# Patient Record
Sex: MALE | Race: WHITE
[De-identification: names, ages, dates, MRNs, and addresses within clinical notes are randomized per-mention and may not be internally consistent; named-entity substitution may affect disease eponyms.]

---

## 2018-08-07 ENCOUNTER — HOSPITAL ENCOUNTER (EMERGENCY)
Dept: HOSPITAL 25 - UCCORT | Age: 83
Discharge: HOME | End: 2018-08-07
Payer: MEDICARE

## 2018-08-07 VITALS — DIASTOLIC BLOOD PRESSURE: 53 MMHG | SYSTOLIC BLOOD PRESSURE: 120 MMHG
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
## 2018-08-07 DIAGNOSIS — I10: ICD-10-CM

## 2018-08-07 DIAGNOSIS — S30.0XXA: Primary | ICD-10-CM
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
## 2018-08-07 DIAGNOSIS — Z88.0: ICD-10-CM

## 2018-08-07 DIAGNOSIS — J44.9: ICD-10-CM

## 2018-08-07 PROCEDURE — 81003 URINALYSIS AUTO W/O SCOPE: CPT

## 2018-08-07 PROCEDURE — 87077 CULTURE AEROBIC IDENTIFY: CPT

## 2018-08-07 PROCEDURE — 99212 OFFICE O/P EST SF 10 MIN: CPT

## 2018-08-07 PROCEDURE — 87186 SC STD MICRODIL/AGAR DIL: CPT

## 2018-08-07 PROCEDURE — 72110 X-RAY EXAM L-2 SPINE 4/>VWS: CPT

## 2018-08-07 PROCEDURE — G0463 HOSPITAL OUTPT CLINIC VISIT: HCPCS

## 2018-08-07 PROCEDURE — 87086 URINE CULTURE/COLONY COUNT: CPT

## 2018-08-07 NOTE — UC
Back Pain HPI





- HPI Summary


HPI Summary: 





The patient is an 83-year-old male who about 12:30 PM today got up on a table 

to adjust a picture.  He fell off the table hitting his back on a bench.  He 

was initially unable to stand due to the pain.  He crawled across the floor and 

after about a half hour was able to stand up.  He states that the pain began to 

fade and he felt pretty comfortable and went home.  He took a 2 hour nap and 

when he woke up the pain was very bad and made it difficult for him to stand.  

He has had no bowel or bladder dysfunction.  He states his back hurts about the 

level of his belt line.  He has had no difficulty urinating and notes that 

there was no blood in his urine.  He has no numbness or tingling of his legs.  

Denies any neck pain head injury chest pain or abdominal pain.  His hips do not 

hurt.  He has not taken anything for pain.





- History of Current Complaint


Chief Complaint: UCBackPain


Stated Complaint: BACK PAIN/PT FELL TODAY


Time Seen by Provider: 08/07/18 17:50


Hx Obtained From: Patient


Onset/Duration: Sudden Onset, Lasting Hours


Severity Initially: Severe


Severity Currently: Severe


Pain Intensity: 10


Back Pain: Is Discrete @


Character: Aching, Spasmodic, Stiffness


Aggravating Factor(s): Movement


Alleviating Factor(s): Rest


Associated Signs And Symptoms: Positive: Negative


Full Body (No Head): 


  __________________________














  __________________________





 1 - tender








- Allergies/Home Medications


Allergies/Adverse Reactions: 


 Allergies











Allergy/AdvReac Type Severity Reaction Status Date / Time


 


Penicillins Allergy  Anaphylatic Verified 08/07/18 17:43





   Shock  











Home Medications: 


 Home Medications





Albuterol Sulfate [Proventil Hfa] 2 puff IH QID 08/07/18 [History Confirmed 08/ 07/18]


DOXYcycline CAP(*) [DOXYcycline 100MG CAP(*)] 100 mg PO DAILY 08/07/18 [History 

Confirmed 08/07/18]


Levocetirizine Dihydrochloride [Xyzal] 5 mg PO DAILY 08/07/18 [History 

Confirmed 08/07/18]


Multivitamin [One-Tablet-Daily] 1 each PO DAILY 08/07/18 [History Confirmed 08/ 07/18]


Spiriva Inhaler DEVICE* [Tiotropium Inhaler DEVICE*] 18 mcg DAILY 08/07/18 [

History Confirmed 08/07/18]











PMH/Surg Hx/FS Hx/Imm Hx


Previously Healthy: Yes


Cardiovascular History: Hypertension


Respiratory History: COPD





- Surgical History


Surgical History: Yes


Surgery Procedure, Year, and Place: Cataracts 10/15





- Family History


Known Family History: Positive: Hypertension





- Social History


Alcohol Use: None


Substance Use Type: None


Smoking Status (MU): Never Smoked Tobacco





Review of Systems


Constitutional: Negative


Skin: Negative


Eyes: Negative


ENT: Negative


Respiratory: Negative


Cardiovascular: Negative


Gastrointestinal: Negative


Genitourinary: Negative


Motor: Negative


Neurovascular: Negative


Musculoskeletal: Arthralgia, Myalgia


Neurological: Negative


Psychological: Negative


Is Patient Immunocompromised?: No


All Other Systems Reviewed And Are Negative: Yes





Physical Exam


Triage Information Reviewed: Yes


Appearance: Well-Appearing, Well-Nourished, Pain Distress


Vital Signs: 


 Initial Vital Signs











Temp  98.3 F   08/07/18 17:39


 


Pulse  103   08/07/18 17:39


 


Resp  19   08/07/18 17:39


 


BP  165/71   08/07/18 17:39


 


Pulse Ox  95   08/07/18 17:39











Vital Signs Reviewed: Yes


Eyes: Positive: Conjunctiva Clear


ENT: Positive: Hearing grossly normal, Uvula midline.  Negative: Nasal 

congestion, Nasal drainage, Trismus, Muffled voice, Hoarse voice


Neck: Positive: Supple, Nontender, No Lymphadenopathy


Respiratory: Positive: Chest non-tender, Lungs clear, Normal breath sounds, No 

respiratory distress, No accessory muscle use


Cardiovascular: Positive: RRR, No Murmur


Abdomen Description: Positive: Nontender, No Organomegaly, Soft


Musculoskeletal: Positive: ROM Intact, No Edema


Neurological Exam: Normal


Neurological: Positive: Alert





Diagnostics





- Radiology


  ** No standard instances


Xray Interpretation: No Acute Changes - Negative for lumbar sacral spine 

fracture or malalignment


Radiology Interpretation Completed By: Radiologist





Re-Evaluation





- Re-Evaluation


  ** First Eval


Re-Evaluation Time: 19:20


Change: Improved


Comment: pain markedly decreased





Back Pain Course/Dx





- Differential Dx/Diagnosis


Provider Diagnoses: back contusion





Discharge





- Sign-Out/Discharge


Documenting (check all that apply): Patient Departure





- Discharge Plan


Condition: Stable


Disposition: HOME


Patient Education Materials:  Contusion in Adults (ED)


Referrals: 


Jasiel Granda DO [Primary Care Provider] - 3 Days (recheck in 3-7 days)


Additional Instructions: 


aleve 1-2 twice daily for pain





tylenol





ice





- Billing Disposition and Condition


Condition: STABLE


Disposition: Home

## 2018-08-07 NOTE — RAD
Indication: Mid lower back pain post fall.



Comparison: No relevant prior exams available on the [facility] PACS for comparison.



Technique: AP, lateral, and oblique views lumbar sacral spine.



Report: Alignment is anatomic. No cortical disruption or trabecular impaction to indicate

a vertebral body fracture. Oblique views without evidence for spondylolysis. Minimal

multilevel vertebral and plate osteophytosis without significant disc space narrowing.

Multilevel predominant mild facet joint osteoarthritis. Unremarkable soft tissue contours.





IMPRESSION: 

#. Negative for lumbar sacral spine fracture or malalignment.

## 2018-08-09 NOTE — UC
- Progress Note


Progress Note: 





UTI + enteroccus


Pt on Doxy


await sensitivity


no change


Ljj 8/9/18





Re-Evaluation





- Re-Evaluation


  ** First Eval


Re-Evaluation Time: 19:20


Change: Improved


Comment: pain markedly decreased





Discharge





- Sign-Out/Discharge


Documenting (check all that apply): Post-Discharge Follow Up





- Discharge Plan


Condition: Stable


Disposition: HOME


Patient Education Materials:  Contusion in Adults (ED)


Referrals: 


Jasiel Granda DO [Primary Care Provider] - 3 Days (recheck in 3-7 days)


Additional Instructions: 


aleve 1-2 twice daily for pain





tylenol





ice





- Billing Disposition and Condition


Condition: STABLE


Disposition: Home

## 2018-10-05 ENCOUNTER — HOSPITAL ENCOUNTER (EMERGENCY)
Dept: HOSPITAL 25 - UCCORT | Age: 83
Discharge: HOME | End: 2018-10-05
Payer: MEDICARE

## 2018-10-05 VITALS — SYSTOLIC BLOOD PRESSURE: 151 MMHG | DIASTOLIC BLOOD PRESSURE: 46 MMHG

## 2018-10-05 DIAGNOSIS — Z87.891: ICD-10-CM
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
## 2018-10-05 DIAGNOSIS — Z88.0: ICD-10-CM

## 2018-10-05 DIAGNOSIS — S01.312A: ICD-10-CM

## 2018-10-05 PROCEDURE — 99211 OFF/OP EST MAY X REQ PHY/QHP: CPT

## 2018-10-05 PROCEDURE — 12011 RPR F/E/E/N/L/M 2.5 CM/<: CPT

## 2018-10-05 PROCEDURE — G0463 HOSPITAL OUTPT CLINIC VISIT: HCPCS

## 2018-10-05 NOTE — UC
Skin Complaint HPI





- HPI Summary


HPI Summary: 





Patient states that he lost his footing at [facility] and tripped causing him to 

hit his left ear on a table.  This happened about one and half hours prior to 

arrival he denies loss of consciousness, headache, visual change, neck and back 

pain.  He denies any other injuries offers no other complaints.  He is not on 

blood thinners other than daily aspirin.  His tetanus is within the past 10 

years.





- History of Current Complaint


Chief Complaint: UCLaceration


Time Seen by Provider: 10/05/18 13:17


Stated Complaint: LEFT EAR LAC


Hx Obtained From: Patient


Onset/Duration: Sudden Onset


Timing: Constant


Pain Intensity: 0


Location: Ear (Left)


Aggravating Factor(s): Nothing


Alleviating Factor(s): Nothing - Pinna


Associated Signs & Symptoms: Negative: Nausea, Syncope, Joint Swelling





- Allergy/Home Medications


Allergies/Adverse Reactions: 


 Allergies











Allergy/AdvReac Type Severity Reaction Status Date / Time


 


Penicillins Allergy  Anaphylatic Verified 10/05/18 13:00





   Shock  














Review of Systems


Constitutional: Negative


Skin: Other - Cut to left ear


Eyes: Negative


ENT: Negative


Respiratory: Negative


Cardiovascular: Negative


Gastrointestinal: Negative


Genitourinary: Negative


Motor: Negative


Neurovascular: Negative


Musculoskeletal: Negative


Neurological: Negative


Psychological: Negative


Is Patient Immunocompromised?: No


All Other Systems Reviewed And Are Negative: Yes





PMH/Surg Hx/FS Hx/Imm Hx





- Additional Past Medical History


Additional PMH: 





Hypotension, COPD, allergies.





- Surgical History


Surgical History: Yes


Surgery Procedure, Year, and Place: Cataracts 10/15





- Family History


Known Family History: Positive: Hypertension





- Social History


Lives: With Family


Alcohol Use: None


Substance Use Type: None


Smoking Status (MU): Former Smoker


When Did the Patient Quit Smoking/Using Tobacco: 96





- Immunization History


Most Recent Tetanus Shot: 3-4 years ago


Hx Tetanus, Diphtheria Vaccination: Yes


Vaccination Up to Date: Yes





Physical Exam


Triage Information Reviewed: Yes


Appearance: Well-Appearing


Vital Signs: 


 Initial Vital Signs











Temp  98.2 F   10/05/18 13:03


 


Pulse  88   10/05/18 13:03


 


Resp  18   10/05/18 13:03


 


BP  151/46   10/05/18 13:03


 


Pulse Ox  96   10/05/18 13:03











Vital Signs Reviewed: Yes


Eyes: Positive: Conjunctiva Clear, Other: - Pupils status post cataract.


ENT: Positive: Pharynx normal, TMs normal, Other - There is a 2 cm laceration 

to the pinna of the left ear.  I cannot appreciate a Cartledge defect.  There 

is mild bruising but no auricular hematoma..  Negative: Nasal congestion, Nasal 

drainage


Neck: Positive: Supple, Nontender, No Lymphadenopathy


Respiratory: Positive: Lungs clear, Normal breath sounds


Cardiovascular: Positive: RRR, No Murmur


Abdomen Description: Positive: Nontender, No Organomegaly, Soft


Bowel Sounds: Positive: Present


Musculoskeletal: Positive: Other: - No cranial or facial bone instability or 

tenderness.  Cervical thoracic and lumbar spine are without deformity or 

tenderness.  Extremities are atraumatic.


Neurological: Positive: Other: - Patient is alert and oriented to person place 

and time.  Cranial nerves grossly intact.  Sensorivascular motor function 

intact 4.  Normal steady gait.


Psychological: Positive: Age Appropriate Behavior


Skin Exam: Normal





Course/Dx





- Course


Course Of Treatment: Procedure: time out done. prep betadine. local with 1ml 1% 

lidocaine. explord, no FB and no cartilage damage appreciated.  Site irrigated 

with copious amounts sterile sodium chloride.  Reprepped with Betadine.  Draped 

in sterile fashion.  Closed with 6-0 nylon and 5 interrupted stitches.  Good 

approximation of wound edges no bleeding postprocedure he should tolerate 

procedure well. 2.0cm in length.  s/s's auricular hematoma d/w pt, he will seek 

recheck immediately for any worsening.





- Diagnoses


Provider Diagnoses: 2cm lac L ear





Discharge





- Sign-Out/Discharge


Documenting (check all that apply): Patient Departure


All imaging exams completed and their final reports reviewed: No Studies





- Discharge Plan


Condition: Stable


Disposition: HOME


Patient Education Materials:  Care For Your Stitches (DC)


Referrals: 


Jasiel Granda DO [Primary Care Provider] - 


Additional Instructions: 


SUTURES OUT IN 5 DAYS.


RECHECK IMMEDIATELY FOR SWELLING.





- Billing Disposition and Condition


Condition: STABLE


Disposition: Home

## 2018-10-05 NOTE — XMS REPORT
Abdoul Feliz

 Created on:2018



Patient:Abdoul Feliz

Sex:Male

:1935

External Reference #:[id number]





Demographics







 Address  [address] [place] [address]

 

 Home Phone  [phone]

 

 Preferred Language  English

 

 Marital Status  Not  Or 

 

 [employer] Affiliation  Unknown

 

 Race  White

 

 Ethnic Group  Not  Or 









Author







 Organization  [facility]

 

 Address  [address], [address] [place] [address]

 

 Phone  [phone]









Support







 Name  Relationship  Address  Phone

 

 Marina Feliz  Wife  Unavailable  [phone]









Care Team Providers







 Name  Role  Phone

 

 KaleeJasiel   Primary Care Physician  Unavailable









Payers







 Type  Date  Identification Numbers  Payment Provider  Subscriber

 

 Medicare Primary  Effective:  Policy Number:  Medicare  Abdoul Felzi



   2000  [id number]    









 PayID: [id number]  [address]









 [place] [address]









 Medigap Part B  Effective: 2017  Policy Number:  [facility]  Abdoul Feliz



     [id number]    









 PayID: [id number]  [address] [address]









 [place] [address]







Problems







 Description

 

 No Information







Family History







 Date  Family Member(s)  Problem(s)  Comments

 

 :  (age 63  Father   due to Natural  



 Years)    Causes  

 

 :  (age 72  Mother   due to Breast  Secondary to compliactions



 Years)    Cancer  of breast cancer with



       metastasis







Social History







 Type  Date  Description  Comments

 

 Marital Status      

 

 Home Environment    Lives With  Wife

 

 Cigarette Use    Former Cigarette Smoker  Quit in 

 

 ETOH Use    Occasionally consumes alcohol  

 

 Recreational Drug Use    Denies Drug Use  

 

 Smoking    Patient is a former smoker  







Allergies, Adverse Reactions, Alerts







 Date  Description  Reaction  Status  Severity  Comments

 

 2017  Penicillins    active    







Medications







 Medication  Date  Status  Form  Strength  Qnty  SIG  Indications  Ordering



                 Provider

 

 Stiolto Respimat    Active  Aerosol  2.5-2.5mc  4unit  take 2  J44.9  
[place]heti,



         g/Act  s  puffs once    MD Alfonso



             daily.    

 

 Spiriva    Active  Capsules  18mcg  30cap  Inhale The  J44.9  Kheti,



 Handihaler          s  Contents    MD Alfonso



             Of One    



             Capsule    



             Via    



             Handihaler    



             By Mouth    



             Daily    

 

 Advair Diskus    Active  Aerosol  500-50mcg    inhale one    Unknown



   /0000      /Dose    puff by    



             mouth once    



             a day if    



             needed.    

 

 Montelukast    Active  Tablets  10mg    1 by mouth    Unknown



 Sodium  /0000          every day    

 

 Hydrochlorothiazi    Active  Tablets  25mg    1 by mouth    Unknown



 de  /          every day    

 

 Atorvastatin    Active  Tablets  10mg    1 by mouth    Unknown



 Calcium  /0000          every day    

 

 Ventolin HFA    Active  Aerosol  108(90Bas    1-2 puffs    Unknown



   /0000      e)    every 4-6    



         mcg/Act    hours as    



             needed    

 

 Dutasteride    Active  Capsules  0.5mg    1 capsule    Unknown



   /          daily.    

 

 Levocetirizine    Active  Tablets  5mg    1 by mouth    Unknown



 Dihydrochloride  /          every day    

 

 Olopatadine HCL    Active  Solution  0.1%    1-2 drops    Unknown



   /          each eye    



             once to    



             twice    



             daily as    



             beeded,    

 

 Vitamin C    Active  Tablets  500mg    1 tablet    Unknown



   /          daily    

 

 Calcium 600 + D    Active  Tablets  600-200mg    twice a    Unknown



   /0000      -Unit    day    

 

 Multivitamin    Active  Tablets  Adlt 50+    1 by mouth    Unknown



 Adults 50+  /0000          every day    

 

 Aspir-81    Active  Tablets DR  81mg    1 by mouth    Unknown



   /          every day    

 

 Lisinopril-Hydroc    Active  Tablets  20-25mg    1 by mouth    Unknown



 hlorothiazide  /0000          every day    

 

 Nasonex    Active  Suspension  50mcg/Act    one spray    Unknown



   /          each    



             nostril    



             one to two    



             times a    



             day    

 

                 

 

 Spiriva    Hx  Capsules  18mcg    1    Unknown



 Handihaler  /0000          inhalation    



             every day    

 

 Lisinopril    Hx  Tablets  10mg    1 by mouth    Unknown



   /0000          every day    

 

 Ipratropium    Hx  Solution  0.06%    2 sprays    Unknown



 Bromide  /0000          to each    



             nostril    



             twice a    



             day    

 

 Doxycycline    Hx  Tablets  20mg    1 tab in    Unknown



 Hyclate  /0000          am; 1 tab    



             in pm. 3    



             months on    



             - 3 months    



             off.    

 

 Spiriva Respimat    Hx  Aerosol  2.5mcg/Ac    take 2    Unknown



   /0000      t    puffs once    



             daily.    







Vital Signs







 Date  Vital  Result  Comment

 

 2018  BP Systolic Sitting Left Arm  128 mmHg  









 BP Diastolic Sitting Left Arm  58 mmHg  

 

 Heart Rate  103 /min  

 

 Respiratory Rate  16 /min  

 

 Height  70 inches  5'10"

 

 Weight  187.00 lb  

 

 BMI (Body Mass Index)  26.8 kg/m2  

 

 BSA (Body Surface Area)  2.03 m2  

 

 Ideal body weight in kilograms  75  

 

 O2 Saturation Level with Exercise  94 %  









 2017  BP Systolic Sitting Right Arm  128 mmHg  









 BP Diastolic Sitting Right Arm  70 mmHg  

 

 Heart Rate  78 /min  

 

 Respiratory Rate  18 /min  

 

 Height  70 inches  5'10"

 

 Weight  180.00 lb  

 

 BMI (Body Mass Index)  25.8 kg/m2  

 

 BSA (Body Surface Area)  2.00 m2  

 

 Ideal body weight in kilograms  75  

 

 O2 % BldC Oximetry  95 %  









 2017  BP Systolic Sitting Right Arm  132 mmHg  









 BP Diastolic Sitting Right Arm  52 mmHg  

 

 Heart Rate  72 /min  

 

 Respiratory Rate  18 /min  

 

 Height  70 inches  5'10"

 

 Weight  187.00 lb  

 

 BMI (Body Mass Index)  26.8 kg/m2  

 

 BSA (Body Surface Area)  2.03 m2  

 

 Ideal body weight in kilograms  75  

 

 O2 % BldC Oximetry  100 %  ra







Results







 Test  Date  Test  Result  H/L  Range  Note

 

 Lymphocytes/leuk NFr  2017  Lymphocytes/leuk NFr  5.9  Low  20.0-42.0  



 Bld Auto    Bld Auto        

 

 MCH RBC Qn Auto  2017  MCH RBC Qn Auto  31.0    27.0-33.0  

 

 MCHC RBC Auto-mCnc  2017  MCHC RBC Auto-mCnc  32.2    31.7-36.0  

 

 MCV RBC Auto  2017  MCV RBC Auto  96.2  High  80.0-96.0  

 

 Monocytes # Bld Auto  2017  Monocytes # Bld Auto  0.33    0.0-0.8  

 

 Monocytes/leuk NFr  2017  Monocytes/leuk NFr  3.0    0.0-10.0  



 Bld Auto    Bld Auto        

 

 Neutrophils # Bld  2017  Neutrophils # Bld  9.94  High  1.8-7.0  



 Auto    Auto        

 

 Neutrophils/leuk NFr  2017  Neutrophils/leuk NFr  91.1  High  33.0-73.0  



 Bld Auto    Bld Auto        

 

 PMV Bld Auto  2017  PMV Bld Auto  11.0  High  6.6-10.6  

 

 Platelets [#/volume]  2017  Platelets [#/volume]  202    150-400  



 in Blood by Automated    in Blood by Automated        



 count    count        

 

 Potassium SerPl-sCnc  2017  Potassium SerPl-sCnc  4.6    3.5-5.1  

 

 RBC # Bld Auto  2017  RBC # Bld Auto  3.97  Low  4.20-5.80  

 

 RDW RBC Auto  2017  RDW RBC Auto  46.9    36-51  

 

 RDW RBC Auto-Rto  2017  RDW RBC Auto-Rto  13.7    11.6-15.8  

 

 Sodium SerPl-sCnc  2017  Sodium SerPl-sCnc  142    136-145  

 

 Unloinc  2017  Unloinc  See Note      1

 

 WBC # Bld Auto  2017  WBC # Bld Auto  10.9  High  3.4-10.5  

 

 Legionella Culture  2017  Legionella Culture  (SEE NOTE)      2, 3

 

 Basic Metabolic Panel  2017  Glucose  169 mg/dL  High    2









 BUN  39 mg/dL  High  7-18  2

 

 Creatinine  1.2 mg/dL    0.6-1.3  2

 

 Glom Filtration Rate, Estimate  >60 mL/min    >60  2

 

 If   >60 mL/min    >60  2, 4

 

 BUN/Creat  32.5 ratio      2

 

 Sodium  142 mmol/L    136-145  2

 

 Potassium  4.6 mmol/L    3.5-5.1  2

 

 Chloride  104 mmol/L      2

 

 Carbon Dioxide  30 mmol/L    21-32  2

 

 Anion Gap  8 mEq/L    8-16  2

 

 Calcium  9.2 mg/dL    8.5-10.1  2









 CBS W/Automated Diff  2017  White Blood Count  10.9 K/uL  High  3.4-10.5
  2









 Red Blood Count  3.97 M/uL  Low  4.20-5.80  2

 

 Hemoglobin  12.3 gm/dL  Low  12.8-17.0  2

 

 Hematocrit  38.2 %    38.0-48.0  2

 

 Mean Cell Volume  96.2 fl  High  80.0-96.0  2

 

 Mean Corpuscular HGB  31.0 pg    27.0-33.0  2

 

 Mean Corpuscular HGB Conc  32.2 g/dL    31.7-36.0  2

 

 Platelet Count  202 K/uL    150-400  2

 

 Red Cell Distri Width SD  46.9 fl    36-51  2

 

 Red Cell Distri Width %CV  13.7 %    11.6-15.8  2

 

 Mean Platelet Volume  11.0 fL  High  6.6-10.6  2

 

 Neut%  91.1 %  High  33.0-73.0  2

 

 Lymph %  5.9 %  Low  20.0-42.0  2

 

 Mono %  3.0 %    0.0-10.0  2

 

 Eo%  0.0 %    0.0-6.6  2

 

 Bas%  0.0 %    0.0-1.1  2

 

 Neut#  9.94 K/uL  High  1.8-7.0  2

 

 Lymph #  0.64 K/uL  Low  1.0-4.0  2

 

 Mono #  0.33 K/uL    0.0-0.8  2

 

 Eos #  0.00 K/uL    0.0-0.5  2

 

 Baso #  0.00 K/uL    0.0-0.1  2









 Slide Review  2017  Slide Review  (SEE NOTE)      2, 5

 

 Anion Gap SerPl-sCnc  2017  Anion Gap SerPl-sCnc  8    8-16  

 

 BUN SerPl-mCnc  2017  BUN SerPl-mCnc  39  High  7-18  

 

 BUN/Creat SerPl  2017  BUN/Creat SerPl  32.5      

 

 Basophils [#/volume]  2017  Basophils [#/volume]  0.00    0.0-0.1  



 in Blood by Automated    in Blood by Automated        



 count    count        

 

 Basophils/leuk NFr  2017  Basophils/leuk NFr  0.0    0.0-1.1  



 Bld Auto    Bld Auto        

 

 Lymphocytes  2017  Lymphocytes  0.64  Low  1.0-4.0  



 [#/volume] in Blood    [#/volume] in Blood        



 by Automated count    by Automated count        

 

 Hgb Bld-mCnc  2017  Hgb Bld-mCnc  12.3  Low  12.8-17.0  

 

 Hct VFr Bld Auto  2017  Hct VFr Bld Auto  38.2    38.0-48.0  

 

 Glucose [Mass/volume]  2017  Glucose [Mass/volume]  169  High    



 in Serum or Plasma    in Serum or Plasma        

 

 Eosinophil/leuk NFr  2017  Eosinophil/leuk NFr  0.0    0.0-6.6  



 Bld Auto    Bld Auto        

 

 Co2 SerPl-sCnc  2017  Co2 SerPl-sCnc  30    21-32  

 

 Calcium SerPl-mCnc  2017  Calcium SerPl-mCnc  9.2    8.5-10.1  

 

 Eosinophil # Bld Auto  2017  Eosinophil # Bld Auto  0.00    0.0-0.5  

 

 Chloride SerPl-sCnc  2017  Chloride SerPl-sCnc  104      

 

 Creat SerPl-mCnc  2017  Creat SerPl-mCnc  1.2    0.6-1.3  

 

 Total Cells Counted  2017  Total Cells Counted  100      



 Bld    Bld        

 

 Neuts Seg/leuk NFr  2017  Neuts Seg/leuk NFr  70    33-73  



 Bld Manual    Bld Manual        

 

 Neuts Band/leuk NFr  2017  Neuts Band/leuk NFr  20  High  0-8  



 Bld Manual    Bld Manual        

 

 Monocytes/100  2017  Monocytes/100  1    0-10  



 leukocytes in Blood    leukocytes in Blood        



 by Manual count    by Manual count        

 

 Basic Metabolic Panel  2017  Glucose  173 mg/dL  High    2









 BUN  47 mg/dL  High  7-18  2

 

 Creatinine  1.3 mg/dL    0.6-1.3  2

 

 Glom Filtration Rate, Estimate  56 mL/min    >60  2

 

 If   >60 mL/min    >60  2, 6

 

 BUN/Creat  36.1 ratio      2

 

 Sodium  140 mmol/L    136-145  2

 

 Potassium  3.8 mmol/L    3.5-5.1  2

 

 Chloride  102 mmol/L      2

 

 Carbon Dioxide  30 mmol/L    21-32  2

 

 Anion Gap  8 mEq/L    8-16  2

 

 Calcium  9.1 mg/dL    8.5-10.1  2









 CBS W/Automated Diff  2017  White Blood Count  11.2 K/uL  High  3.4-10.5
  2









 Red Blood Count  3.96 M/uL  Low  4.20-5.80  2

 

 Hemoglobin  12.5 gm/dL  Low  12.8-17.0  2

 

 Hematocrit  38.2 %    38.0-48.0  2

 

 Mean Cell Volume  96.5 fl  High  80.0-96.0  2

 

 Mean Corpuscular HGB  31.6 pg    27.0-33.0  2

 

 Mean Corpuscular HGB Conc  32.7 g/dL    31.7-36.0  2

 

 Platelet Count  187 K/uL    150-400  2

 

 Red Cell Distri Width SD  47.6 fl    36-51  2

 

 Red Cell Distri Width %CV  13.9 %    11.6-15.8  2

 

 Mean Platelet Volume  11.7 fL  High  6.6-10.6  2, 7

 

 Neut#  10.13 K/uL  High  1.8-7.0  2

 

 Lymph #  0.58 K/uL  Low  1.0-4.0  2

 

 Mono #  0.46 K/uL    0.0-0.8  2

 

 Eos #  0.00 K/uL    0.0-0.5  2

 

 Baso #  0.01 K/uL    0.0-0.1  2









 Slide Review  2017  Slide Review  DIFF ORDERED      2

 

 Lymphocytes/100  2017  Lymphocytes/100  9  Low  20-42  



 leukocytes in Blood by    leukocytes in Blood by        



 Manual coun    Manual count        

 

 Differential-WBC Confirm  2017  Total Cells Counted  100 #CELLS      2









 Band%  20 %  High  0-8  2

 

 Neutrophils%  70 %    33-73  2

 

 Lymph%  9 %  Low  20-42  2

 

 Monocyte%  1 %    0-10  2

 

 Platelet Estimate  NORMAL      2

 

 Dohle Bodies  0-1+      2









 Blood platelet adequacy  2017  Blood platelet adequacy  Normal      



 detection by light    detection by light        



 microsc    microscopy        

 

 Serum or plasma  2017  Serum or plasma  0.094      



 troponin i.cardiac    troponin i.cardiac        



 measurement (ma    measurement        



     (mass/volume)        

 

 Serum or plasma  2017  Serum or plasma  158      



 creatine kinase    creatine kinase        



 measurement (enzym    measurement (enzymatic        



     activity/volume)        

 

 Prot SerPl-mCnc  2017  Prot SerPl-mCnc  7.6    6.4-8.  



           2  

 

 Globulin Ser Calc-mCnc  2017  Globulin Ser Calc-mCnc  4.4  High  1.9-4.  



           3  

 

 Bilirub SerPl-mCnc  2017  Bilirub SerPl-mCnc  0.7    0.2-1.  



           0  

 

 Aspartate  2017  Aspartate  28    15-37  



 aminotransferase    aminotransferase        



 [Enzymatic activity/vol    [Enzymatic        



     activity/volume] in        



     Serum or Plasma        

 

 Albumin/Glob SerPl  2017  Albumin/Glob SerPl  0.7      

 

 Albumin SerPl-mCnc  2017  Albumin SerPl-mCnc  3.2  Low  3.4-5.  



           0  

 

 Alt SerPl-cCnc  2017  Alt SerPl-cCnc  34    12-78  

 

 Alp SerPl-cCnc  2017  Alp SerPl-cCnc  83      

 

 Fibrin D-dimer Feu  2017  Fibrin D-dimer Feu  0.87      



 measurement in platelet    measurement in platelet        



 poor pl    poor plasma        



     (mass/volume)        

 

 Blood Culture  2017  Blood Culture Aerobic  NO GROWTH:      2, 8



       FINAL <SEE      



       NOTE>      









 Blood Culture Anaerobic  NO GROWTH: FINAL <SEE NOTE>      2, 9









 Lactate [Moles/volume] in  2017  Lactate [Moles/volume] in  1.9    0.4-
1.9  



 Serum or Plasma    Serum or Plasma        

 

 Bacteria Bld Aerobe Cult  2017  Bacteria Bld Aerobe Cult  No Growth      

 

 Anaerobic blood culture  2017  Anaerobic blood culture  No Growth      

 

 Unloinc  2017  Unloinc  [employer].      









 Unloinc  Oxygen      

 

 Unloinc  Yes      









 Arterial blood partial  2017  Arterial blood partial  65  Low    



 pressure of oxygen with    pressure of oxygen with        



 tem    temperature correction        

 

 Arterial blood partial  2017  Arterial blood partial  52  High  35-45  



 pressure of carbon dioxide    pressure of carbon        



     dioxide with temperature        



     correction        

 

 Arterial blood pH  2017  Arterial blood pH  7.34  Low  7.35-7.45  



 measurement with patient    measurement with patient        



 tempera    temperature correction        

 

 Arterial blood oxygen  2017  Arterial blood oxygen  89  Low  90-99  



 saturation measurement    saturation measurement        

 

 Arterial blood bicarbonate  2017  Arterial blood  27  High  22-26  



 measurement (moles/volu    bicarbonate measurement        



     (moles/volume)        

 

 Arterial blood base excess  2017  Arterial blood base  1    -2-2  



 by calculation    excess by calculation        

 

 * Inhaled oxygen flow rate  2017  * Inhaled oxygen flow  3    0-20  



     rate        









 1  Instrument flagged sample for slide review. Less than 10% Bands seen, no 
other



   immature WBC's seen. RBC morphology essentially normal. Platelet estimate=
Normal

 

 2  COPD EXACERBATION, AC BRONCHITIS

 

 3  Legionella Species Culture Report:



       ***  No Legionella species isolated. ***



   



   Performed at:  [facility] [address]  [id number]



   : Araceli B Reyes MD, Phone:  [phone]

 

 4  Note:



   Persistent reduction for 3 months or more in an eGFR <60



   mL/min/1.73 m2 defines CKD.  Patients with eGFR values >/=60



   mL/min/1.73 m2 may also have CKD if evidence of persistent



   proteinuria is present.



   



   The original MDRD equation for estimated GFR is not valid



   for patients less than 18 years of age.



   



   Additional information may be found at www.kdoqi.org.

 

 5  Instrument flagged sample for slide review.



   Less than 10% Bands seen, no other immature WBC's seen.



   RBC morphology essentially normal.



   Platelet estimate=NORMAL

 

 6  Note:



   Persistent reduction for 3 months or more in an eGFR <60



   mL/min/1.73 m2 defines CKD.  Patients with eGFR values >/=60



   mL/min/1.73 m2 may also have CKD if evidence of persistent



   proteinuria is present.



   



   The original MDRD equation for estimated GFR is not valid



   for patients less than 18 years of age.



   



   Additional information may be found at www.kdoqi.org.

 

 7  17 0911:



   NEUT% previously reported as: 90.6 H %



   Amended result called to: [] - 17 at 0917 0911:



   LYMPH % previously reported as: 5.2 L %



   Amended result called to: [] - 17 at 09 0911:



   MONO % previously reported as: 4.1  %



   Amended result called to: [] - 17 at 0911



   



   17 0911:



   EO% previously reported as: 0.0  %



   Amended result called to: [] - 17 at 0911



   



   17 0911:



   BAS% previously reported as: 0.1  %



   Amended result called to: [] - 17 at 0911

 

 8  NO GROWTH: FINAL REPORT

 

 9  NO GROWTH: FINAL REPORT







Procedures







 Date  CPT Code  Description  Status

 

 2017  63602  Bronchospasm Provocation Evaluation Multi Spirometric  
Completed



     Determinati  

 

 2017  94850  Spirometry  Completed







Encounters







 Type  Date  Location  Provider  CPT E/M  Dx

 

 Office Visit  2017  2:30p  Pulmonology  Alfonso Michael MD  [address]  J44.9









 Z87.891

 

 Z79.51









 Office Visit  2017  1:00p  Pulmonology  Alfonso Michael MD  [address]74  J44.9









 F17.211

 

 J30.89







Plan of Care

Future Appointment(s):2019  1:45 pm - Alfonso Michael MD at [id number]
/2018 - Alfonso Michael MDJ44.9 Chronic obstructive pulmonary disease, 
unspecifiedNew Medication:Stiolto Respimat 2.5-2.5 mcg/ActComments:GOLD Stage B 
COPD; moderate to severe symptoms but without frequent ex acerbations. I am 
going to change Spiriva to Stiolto (2 puffs once in the morning). He has about 
1 1/2 months left of Spiriva at home and wishes to finish that before 
changing.Follow up:5 months

## 2018-10-05 NOTE — XMS REPORT
Continuity of Care Document (CCD)

 Created on:2018



Patient:Abdoul Feliz

Sex:Male

:1935

External Reference #:[id number]





Demographics







 Address  [address] [place] [address]

 

 Home Phone  [phone]

 

 Mobile Phone  [phone]

 

 Preferred Language  en

 

 Marital Status  Not  or 

 

 Christianity Affiliation  Unknown

 

 Race  White

 

 Ethnic Group  Not  or 









Author







 Name  Joi Orellana









Care Team Providers







 Name  Role  Phone

 

 Cassia Rodas M.D.  Care Team Information   Unavailable

 

 Kenia Durham PA-C  Primary Care Physician  Unavailable









Payers







 Type  Date  Identification Numbers  Payment Provider  Subscriber

 

     Policy Number: [id number]  Medicare  Abdoul Feliz









 PayID: [id number]  [address]









 [facility] [place] [address]









   Effective: 2012  Policy Number: [id number]  [facility]  Abdoul Feliz









 PayID: [id number]  [address] [address]









 [place] [address]







Advance Directives







 Description

 

 No Information Available







Problems







 Description

 

 No Information







Family History







 Date  Family Member(s)  Problem(s)  Comments

 

   General  neck cancer-sister  

 

 :  (age 65 Years)  Father   due to Unknown Causes  

 

 :  (age 72 Years)  Mother   due to Cancer  







Social History







 Type  Date  Description  Comments

 

 Birth Sex    Unknown  

 

 Marital Status      

 

 Occupation    Retired  

 

 Occupation      

 

 Tobacco Use  Start: Unknown End:  Used To Smoke Cigarettes But  



   Unknown  Quit.  

 

 ETOH Use    Rarely consumed alcohol in  



     the past  

 

 Recreational Drug Use    Never Used Drugs  







Allergies, Adverse Reactions, Alerts







 Date  Description  Reaction  Status  Severity  Comments

 

 2013  Penicillin    Active    

 

 2013  Bee Sting    Active    

 

 10/01/2018  Ampicillin    Active    







Medications







 Medication  Date  Status  Form  Strength  Qnty  SIG  Indications  Ordering



                 Provider

 

 Advair Diskus  /  Active  Aerosol      1 puff    Unknown



   0000          twice a    



             day    

 

 Vitamin C  /  Active  Tablets      1 by    Unknown



   0000          mouth    



             every day    

 

 Multivitamins  /  Active  Capsules      1 a day    Unknown



   0000              

 

 Calcium + D  00/  Active  Chewtabs          Unknown



   0000              

 

 Montelukast  /  Active  Tablets  10mg    1 by    Unknown



 Sodium  0000          mouth    



             every day    

 

 Avodart  /  Active  Capsules          Unknown



   0000              

 

 Olopatadine HCL  /  Active  Solution          Unknown



   0000              

 

 Levocetirizine  /  Active  Tablets  5mg    1 by    Unknown



 Dihydrochloride  0000          mouth    



             every day    

 

 Spiriva  00/00/  Active  Capsules  18mcg    1 by    Unknown



 Handihaler  0000          mouth    



             every day    

 

 Atorvastatin  00/  Active  Tablets      1 by    Unknown



 Calcium  0000          mouth    



             every day    

 

 Aspirin  00/00/  Active  Tablets DR  81mg    everyday    Unknown



   0000              

 

 Lisinopril-Hydroc  /  Active  Tablets      1 by    Unknown



 hlorothiazide  0000          mouth    



             every day    

 

 Proair HFA  /  Active  Aerosol  108(90Bas    2puffs    Unknown



   0000      e)    four    



         mcg/Act    times a    



             day as    



             needed    



             for sob    

 

 Mometasone  /  Active  Suspension      2 squirts    Unknown



 Furoate  0000          each    



             nostril    



             every day    

 

                 

 

 Cetirizine HCL  /  Hx  Tablets  10mg  90tab  /2-1 po    Jt,



   2013 -        s  qd as    Shawn,



   /          needed    M.D.



   2018              

 

 Patanol  /  Hx  Solution  0.1%  3bott  1 drop    Jt,



    -        le  each eye    Shawn,



   /          twice    M.D.



   2018          daily for    



             itching    

 

 Astelin  /  Hx  Solution  137mcg/Sp  3unit  2 sprays    Jt,



   2013 -      ray  s  each    Shawn,



   /          nostil    M.D.



   2018          qhs    

 

 Hydrocortisone  /  Hx  Cream  2.5%    tid to    Unknown



   0000 -          affected    



   /          area    



   2018              

 

 Advair Diskus  00/00/  Hx  Aerosol  500-50mcg  1unit  1 puff    Unknown



   0000 -      /Dose  s  twice    



   /          daily    



   2018              

 

 Advair Diskus  00/00/  Hx  Aerosol  100-50mcg  1unit  1 puff    Unknown



   0000 -      /Dose  s  bid    



   2018              

 

 Lisinopril  /  Hx  Tablets  10mg        Unknown



    -              



   2018              

 

 Multivitamin  /00/  Hx        qd    Unknown



    -              



   2018              

 

 Vitamin C  /  Hx    250mg        Unknown



    -              



   2018              

 

 Aspirin  00/  Hx  Tablets DR  81mg    qday    Unknown



    -              



   2018              

 

 Avodart  /  Hx  Capsules  0.5mg        Unknown



    -              



   2018              

 

 Calcium  /00/  Hx    600mg        Unknown



    -              



   2018              







Immunizations







 Description

 

 No Information Available







Vital Signs







 Date  Vital  Result  Comment

 

 10/01/2018 10:37am  Weight  189.00 lb  









 Height  68.5 inches  5'8.50"

 

 BMI (Body Mass Index)  28.3 kg/m2  

 

 BP Systolic  146 mmHg  

 

 BP Diastolic  58 mmHg  

 

 Heart Rate  72 /min  

 

 O2 % BldC Oximetry  92 %  

 

 Body Temperature  97.6 F  

 

 Pain Level  0  









 2013  9:10am  Weight  198.00 lb  









 Height  68.5 inches  5'8.50"

 

 BMI (Body Mass Index)  29.7 kg/m2  

 

 BP Systolic  144 mmHg  

 

 BP Diastolic  60 mmHg  

 

 Body Temperature  98.9 F  









 2013  2:02pm  Weight  201.00 lb  









 Height  68.5 inches  5'8.50"

 

 BMI (Body Mass Index)  30.1 kg/m2  

 

 BP Systolic  137 mmHg  

 

 BP Diastolic  68 mmHg  

 

 Heart Rate  78 /min  

 

 O2 % BldC Oximetry  96 %  

 

 Body Temperature  97.9 F  







Results







 Description

 

 No Information Available







Procedures







 Description

 

 No Information Available







Encounters







 Type  Date  Location  Provider  Dx  Diagnosis

 

 Office Visit  2013  9:00a  Main Office  Brittany Pack,  472.0  
Rhinitis Chronic



       PA    









 995.3  Allergy Unspec

 

 372.14  Conjunctivitis Chronic Allergic Other









 Office Visit  2013  1:45p  Main Office  Yoly HICKS  478.0  Hypertrophy 
Nasal



       Joe, NP    Turbinates









 472.0  Rhinitis Chronic







Plan of Treatment

No Information Available

## 2018-10-10 ENCOUNTER — HOSPITAL ENCOUNTER (EMERGENCY)
Dept: HOSPITAL 25 - UCCORT | Age: 83
Discharge: HOME | End: 2018-10-10
Payer: MEDICARE

## 2018-10-10 VITALS — SYSTOLIC BLOOD PRESSURE: 125 MMHG | DIASTOLIC BLOOD PRESSURE: 46 MMHG

## 2018-10-10 DIAGNOSIS — S01.312D: Primary | ICD-10-CM

## 2018-10-10 NOTE — XMS REPORT
Continuity of Care Document (CCD)

 Created on:October 10, 2018



Patient:Debbie Feliz

Sex:Male

:1935

External Reference #:[id number]





Demographics







 Address  [address] [place] [address]

 

 Home Phone  [phone]

 

 Mobile Phone  [phone]

 

 Preferred Language  en

 

 Marital Status  Not  or 

 

 Gnosticist Affiliation  Unknown

 

 Race  White

 

 Ethnic Group  Not  or 









Author







 Name  Sun Shi









Care Team Providers







 Name  Role  Phone

 

 Cassia Rodas M.D.  Care Team Information   Unavailable

 

 Kenia Durham PA-C  Primary Care Physician  Unavailable









Payers







 Type  Date  Identification Numbers  Payment Provider  Subscriber

 

     Policy Number: [id number]  Medicare  Debbie Feliz









 PayID: [id number]  [address]









 [facility] [place] [address]









   Effective: 2012  Policy Number: [id number]  [facility]  Debbie Feliz









 PayID: [id number]  [address] [address]









 [place] [address]







Advance Directives







 Description

 

 No Information Available







Problems







 Description

 

 No Information







Family History







 Date  Family Member(s)  Problem(s)  Comments

 

   General  neck cancer-sister  

 

 :  (age 65 Years)  Father   due to Unknown Causes  

 

 :  (age 72 Years)  Mother   due to Cancer  







Social History







 Type  Date  Description  Comments

 

 Birth Sex    Unknown  

 

 Marital Status      

 

 Occupation    Retired  

 

 Occupation      

 

 Tobacco Use  Start: Unknown End:  Used To Smoke Cigarettes But  



   Unknown  Quit.  

 

 ETOH Use    Rarely consumed alcohol in  



     the past  

 

 Recreational Drug Use    Never Used Drugs  







Allergies, Adverse Reactions, Alerts







 Date  Description  Reaction  Status  Severity  Comments

 

 2013  Penicillin    Active    

 

 2013  Bee Sting    Active    

 

 10/01/2018  Ampicillin    Active    







Medications







 Medication  Date  Status  Form  Strength  Qnty  SIG  Indications  Ordering



                 Provider

 

 Percocet  10/03/  Active  Tablets  5-325mg  14tab  1-2 by    Jules Waters        s  mouth    Shawn,



             four    M.D.



             times a    



             day as    



             needed    



             for pain    

 

 Advair Diskus  /  Active  Aerosol      1 puff    Unknown



   0000          twice a    



             day    

 

 Vitamin C  /  Active  Tablets      1 by    Unknown



   0000          mouth    



             every day    

 

 Multivitamins  /  Active  Capsules      1 a day    Unknown



   0000              

 

 Calcium + D  /  Active  Chewtabs          Unknown



   0000              

 

 Montelukast  /  Active  Tablets  10mg    1 by    Unknown



 Sodium  0000          mouth    



             every day    

 

 Avodart  00//  Active  Capsules          Unknown



   0000              

 

 Olopatadine HCL  /  Active  Solution          Unknown



   0000              

 

 Levocetirizine  /  Active  Tablets  5mg    1 by    Unknown



 Dihydrochloride  0000          mouth    



             every day    

 

 Spiriva  /  Active  Capsules  18mcg    1 by    Unknown



 Handihaler  0000          mouth    



             every day    

 

 Atorvastatin  /  Active  Tablets      1 by    Unknown



 Calcium  0000          mouth    



             every day    

 

 Aspirin  /  Active  Tablets DR  81mg    everyday    Unknown



   0000              

 

 Lisinopril-Hydroc  /  Active  Tablets      1 by    Unknown



 hlorothiazide  0000          mouth    



             every day    

 

 Proair HFA  /  Active  Aerosol  108(90Bas    2puffs    Unknown



   0000      e)    four    



         mcg/Act    times a    



             day as    



             needed    



             for sob    

 

 Mometasone  /  Active  Suspension      2 squirts    Unknown



 Furoate  0000          each    



             nostril    



             every day    

 

                 

 

 Cetirizine HCL  /  Hx  Tablets  10mg  90tab  1/2-1 po    Jt,



   2013 -        s  qd as    Shawn,



   /          needed    M.D.



   2018              

 

 Patanol  /  Hx  Solution  0.1%  3bott  1 drop    Jt,



   2013 -        le  each eye    Shawn,



   /          twice    M.D.



   2018          daily for    



             itching    

 

 Astelin  /  Hx  Solution  137mcg/Sp  3unit  2 sprays    Jt,



   2013 -      ray  s  each    Shawn,



   /          nostil    M.D.



   2018          qhs    

 

 Hydrocortisone  /  Hx  Cream  2.5%    tid to    Unknown



   0000 -          affected    



   /          area    



                 

 

 Advair Diskus  00//  Hx  Aerosol  500-50mcg  1unit  1 puff    Unknown



   0000 -      /Dose  s  twice    



   /          daily    



   2018              

 

 Advair Diskus  00/00/  Hx  Aerosol  100-50mcg  1unit  1 puff    Unknown



   0000 -      /Dose  s  bid    



   2018              

 

 Lisinopril  /  Hx  Tablets  10mg        Unknown



    -              



   2018              

 

 Multivitamin  /00/  Hx        qd    Unknown



    -              



   2018              

 

 Vitamin C  //  Hx    250mg        Unknown



    -              



   2018              

 

 Aspirin  //  Hx  Tablets DR  81mg    qday    Unknown



    -              



   2018              

 

 Avodart  /  Hx  Capsules  0.5mg        Unknown



   0000 -              



   2018              

 

 Calcium  00/00/  Hx    600mg        Unknown



   0000 -              



   2018              







Immunizations







 Description

 

 No Information Available







Vital Signs







 Date  Vital  Result  Comment

 

 10/10/2018 10:22am  Weight  188.00 lb  









 Height  68.5 inches  5'8.50"

 

 BMI (Body Mass Index)  28.2 kg/m2  

 

 BP Systolic  165 mmHg  

 

 BP Diastolic  50 mmHg  

 

 Heart Rate  91 /min  

 

 O2 % BldC Oximetry  92 %  

 

 Body Temperature  98.0 F  

 

 Pain Level  0  









 10/01/2018 10:37am  Weight  189.00 lb  









 Height  68.5 inches  5'8.50"

 

 BMI (Body Mass Index)  28.3 kg/m2  

 

 BP Systolic  146 mmHg  

 

 BP Diastolic  58 mmHg  

 

 Heart Rate  72 /min  

 

 O2 % BldC Oximetry  92 %  

 

 Body Temperature  97.6 F  

 

 Pain Level  0  









 2013  9:10am  Weight  198.00 lb  









 Height  68.5 inches  5'8.50"

 

 BMI (Body Mass Index)  29.7 kg/m2  

 

 BP Systolic  144 mmHg  

 

 BP Diastolic  60 mmHg  

 

 Body Temperature  98.9 F  









 2013  2:02pm  Weight  201.00 lb  









 Height  68.5 inches  5'8.50"

 

 BMI (Body Mass Index)  30.1 kg/m2  

 

 BP Systolic  137 mmHg  

 

 BP Diastolic  68 mmHg  

 

 Heart Rate  78 /min  

 

 O2 % BldC Oximetry  96 %  

 

 Body Temperature  97.9 F  







Results







 Test  Date  Facility  Test  Result  H/L  Range  Note

 

 Laboratory test  10/03/2018  [facility]  Surgical  SEE RESULT      1



 finding    [address]  Pathology  [place]ELOW      



     [place] [address]          









 1  SEE RESULT BELOW



   -----------------------------------------------------------------------------
---------------



   Name:  DEBBIE FELIZ                  : 1935    Attend Dr: Shawn Waters MD



   Acct:  [id number]  Unit: [id number]  AGE: 83            Location:  [facility]



   Reg:   10/03/18                        SEX: M             Status:    REG REF



   -----------------------------------------------------------------------------
---------------



   



   SPEC: [id number]            ADELAIDE: 10/03/      [facility] DR: Shawn Waters MD



   REQ:  [id number]             RECD: 10/03/



   STATUS: SOUT



   _



   ORDERED:  LEVEL 4



   COMMENTS: [id number]



   



   FINAL DIAGNOSIS



   



   



   Temporal artery, right, biopsy:



   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 
inflammation;



   see comment.



   



   



   -----------------------------------------------------------------------------
---------------



   



   COMMENT:



   Histologic sections show benign arterial tissue with an intact



   internal elastic lamina and no evidence of medial



   inflammation.  A small feeder vessel shows a mild perivascular



   lymphoid infiltrate in some levels, a non-specific finding,



   but one that may be seen in partially treated temporal



   arteritis.



   



   CLINICAL HISTORY



   



   No history given



   



   GROSS DESCRIPTION



   



   The specimen is received in formalin labeled, Right Temporal Artery Biopsy, 
and consists of



   a 2.7 by up to 0.2 cm tan-gray tubular soft tissue fragment with scant 
adherent yellow fat.



   The specimen is serially sectioned and entirely submitted in one cassette.



   



   Signed by and Reported on: __________              Daniela Sosa MD 
10/05/18 0932



   



   -----------------------------------------------------------------------------
---------------



   



   



   



   



   



   ** END OF REPORT **



   



   DEPARTMENT OF PATHOLOGY,  [address] [address]



   Phone # [phone]      Fax #[phone]



   Nayan Collins M.D. Director     [facility] # [id number]







Procedures







 Description

 

 No Information Available







Encounters







 Type  Date  Location  Provider  Dx  Diagnosis

 

 Office Visit  10/01/2018 10:30a  Main Office  Shawn Waters M.D.  H53.2  
Diplopia









 J31.0  Chronic rhinitis









 Office Visit  2013  9:00a  Main Office  Brittany Pack,  472.0  
Rhinitis Chronic



       PA    









 995.3  Allergy Unspec

 

 372.14  Conjunctivitis Chronic Allergic Other









 Office Visit  2013  1:45p  Main Office  Yoly A  478.0  Hypertrophy 
Nasal



       Joe, NP    Turbinates









 472.0  Rhinitis Chronic







Plan of Treatment

No Information Available

## 2018-10-10 NOTE — XMS REPORT
Continuity of Care Document (CCD)

 Created on:October 10, 2018



Patient:Abdoul Feliz

Sex:Male

:1935

External Reference #:[id number]





Demographics







 Address  [address] [place] [address]

 

 Home Phone  [phone]

 

 Mobile Phone  [phone]

 

 Preferred Language  en

 

 Marital Status  Not  or 

 

 Pentecostal Affiliation  Unknown

 

 Race  White

 

 Ethnic Group  Not  or 









Author







 Name  Yoly Joe NP

 

 Address  [address] [place] [address]









Care Team Providers







 Name  Role  Phone

 

 Cassia Rodas M.D.  Care Team Information   Unavailable

 

 Kenia Durham PA-C  Primary Care Physician  Unavailable









Payers







 Type  Date  Identification Numbers  Payment Provider  Subscriber

 

     Policy Number: [id number]  Medicare  Abdoul Feliz









 PayID: [id number]  [address]









 [facility] [place] [address]









   Effective: 2012  Policy Number: [id number]  [facility]  Abdoul Feliz









 PayID: [id number]  [address] [address]









 [place] [address]







Advance Directives







 Description

 

 No Information Available







Problems







 Description

 

 No Information







Family History







 Date  Family Member(s)  Problem(s)  Comments

 

   General  neck cancer-sister  

 

 :  (age 65 Years)  Father   due to Unknown Causes  

 

 :  (age 72 Years)  Mother   due to Cancer  







Social History







 Type  Date  Description  Comments

 

 Birth Sex    Unknown  

 

 Marital Status      

 

 Occupation    Retired  

 

 Occupation      

 

 Tobacco Use  Start: Unknown End:  Used To Smoke Cigarettes But  



   Unknown  Quit.  

 

 ETOH Use    Rarely consumed alcohol in  



     the past  

 

 Recreational Drug Use    Never Used Drugs  







Allergies, Adverse Reactions, Alerts







 Date  Description  Reaction  Status  Severity  Comments

 

 2013  Penicillin    Active    

 

 2013  Bee Sting    Active    

 

 10/01/2018  Ampicillin    Active    







Medications







 Medication  Date  Status  Form  Strength  Qnty  SIG  Indications  Ordering



                 Provider

 

 Percocet  10/03/  Active  Tablets  5-325mg  14tab  1-2 by    Jules Waters        s  mouth    Shawn,



             four    M.D.



             times a    



             day as    



             needed    



             for pain    

 

 Advair Diskus  /  Active  Aerosol      1 puff    Unknown



   0000          twice a    



             day    

 

 Vitamin C  /  Active  Tablets      1 by    Unknown



   0000          mouth    



             every day    

 

 Multivitamins  /  Active  Capsules      1 a day    Unknown



   0000              

 

 Calcium + D  /  Active  Chewtabs          Unknown



   0000              

 

 Montelukast  /  Active  Tablets  10mg    1 by    Unknown



 Sodium  0000          mouth    



             every day    

 

 Avodart  /  Active  Capsules          Unknown



   0000              

 

 Olopatadine HCL  /  Active  Solution          Unknown



   0000              

 

 Levocetirizine  /  Active  Tablets  5mg    1 by    Unknown



 Dihydrochloride  0000          mouth    



             every day    

 

 Spiriva  /  Active  Capsules  18mcg    1 by    Unknown



 Handihaler  0000          mouth    



             every day    

 

 Atorvastatin  /  Active  Tablets      1 by    Unknown



 Calcium  0000          mouth    



             every day    

 

 Aspirin  /  Active  Tablets DR  81mg    everyday    Unknown



   0000              

 

 Lisinopril-Hydroc  /  Active  Tablets      1 by    Unknown



 hlorothiazide  0000          mouth    



             every day    

 

 Proair HFA  /  Active  Aerosol  108(90Bas    2puffs    Unknown



   0000      e)    four    



         mcg/Act    times a    



             day as    



             needed    



             for sob    

 

 Mometasone  /  Active  Suspension      2 squirts    Unknown



 Furoate  0000          each    



             nostril    



             every day    

 

                 

 

 Cetirizine HCL  /  Hx  Tablets  10mg  90tab  1/2-1 po    Jt,



   2013 -        s  qd as    Shawn,



   /          needed    M.D.



   2018              

 

 Patanol  /  Hx  Solution  0.1%  3bott  1 drop    Jt,



   2013 -        le  each eye    Shawn,



   /          twice    M.D.



   2018          daily for    



             itching    

 

 Astelin  /  Hx  Solution  137mcg/Sp  3unit  2 sprays    Jt,



   2013 -      ray  s  each    Shawn,



   /          nostil    M.D.



   2018          qhs    

 

 Hydrocortisone  /  Hx  Cream  2.5%    tid to    Unknown



   0000 -          affected    



   /          area    



   2018              

 

 Advair Diskus  00/  Hx  Aerosol  500-50mcg  1unit  1 puff    Unknown



   0000 -      /Dose  s  twice    



   /          daily    



   2018              

 

 Advair Diskus  0000/  Hx  Aerosol  100-50mcg  1unit  1 puff    Unknown



   0000 -      /Dose  s  bid    



   2018              

 

 Lisinopril  /  Hx  Tablets  10mg        Unknown



    -              



   2018              

 

 Multivitamin  /00/  Hx        qd    Unknown



    -              



   2018              

 

 Vitamin C  //  Hx    250mg        Unknown



    -              



   2018              

 

 Aspirin  /  Hx  Tablets DR  81mg    qday    Unknown



    -              



   2018              

 

 Avodart  /  Hx  Capsules  0.5mg        Unknown



   2018              

 

 Calcium  /  Hx    600mg        Unknown



   2018              







Immunizations







 Description

 

 No Information Available







Vital Signs







 Date  Vital  Result  Comment

 

 10/10/2018 10:22am  Weight  188.00 lb  









 Height  68.5 inches  5'8.50"

 

 BMI (Body Mass Index)  28.2 kg/m2  

 

 BP Systolic  165 mmHg  

 

 BP Diastolic  50 mmHg  

 

 Heart Rate  91 /min  

 

 O2 % BldC Oximetry  92 %  

 

 Body Temperature  98.0 F  

 

 Pain Level  0  









 10/01/2018 10:37am  Weight  189.00 lb  









 Height  68.5 inches  5'8.50"

 

 BMI (Body Mass Index)  28.3 kg/m2  

 

 BP Systolic  146 mmHg  

 

 BP Diastolic  58 mmHg  

 

 Heart Rate  72 /min  

 

 O2 % BldC Oximetry  92 %  

 

 Body Temperature  97.6 F  

 

 Pain Level  0  









 2013  9:10am  Weight  198.00 lb  









 Height  68.5 inches  5'8.50"

 

 BMI (Body Mass Index)  29.7 kg/m2  

 

 BP Systolic  144 mmHg  

 

 BP Diastolic  60 mmHg  

 

 Body Temperature  98.9 F  









 2013  2:02pm  Weight  201.00 lb  









 Height  68.5 inches  5'8.50"

 

 BMI (Body Mass Index)  30.1 kg/m2  

 

 BP Systolic  137 mmHg  

 

 BP Diastolic  68 mmHg  

 

 Heart Rate  78 /min  

 

 O2 % BldC Oximetry  96 %  

 

 Body Temperature  97.9 F  







Results







 Test  Date  Facility  Test  Result  H/L  Range  Note

 

 Laboratory test  10/03/2018  [facility]  Surgical  SEE RESULT      1



 finding    [address]  Pathology  BELOW      



     [place] [address]          









 1  SEE RESULT BELOW



   -----------------------------------------------------------------------------
---------------



   Name:  ABDOUL FELIZ                  : 1935    Attend Dr: Shawn Waters MD



   Acct:  [id number]  Unit: [id number]  AGE: 83            Location:  [facility]



   Reg:   10/03/18                        SEX: M             Status:    REG REF



   -----------------------------------------------------------------------------
---------------



   



   SPEC: [id number]            ADELAIDE: 10/03/      [facility] DR: Shawn Waters MD



   REQ:  [id number]             RECD: 10/03/



   STATUS: SOUT



   _



   ORDERED:  LEVEL 4



   COMMENTS: [id number]



   



   FINAL DIAGNOSIS



   



   



   Temporal artery, right, biopsy:



   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 
inflammation;



   see comment.



   



   



   -----------------------------------------------------------------------------
---------------



   



   COMMENT:



   Histologic sections show benign arterial tissue with an intact



   internal elastic lamina and no evidence of medial



   inflammation.  A small feeder vessel shows a mild perivascular



   lymphoid infiltrate in some levels, a non-specific finding,



   but one that may be seen in partially treated temporal



   arteritis.



   



   CLINICAL HISTORY



   



   No history given



   



   GROSS DESCRIPTION



   



   The specimen is received in formalin labeled, Right Temporal Artery Biopsy, 
and consists of



   a 2.7 by up to 0.2 cm tan-gray tubular soft tissue fragment with scant 
adherent yellow fat.



   The specimen is serially sectioned and entirely submitted in one cassette.



   



   Signed by and Reported on: __________              Daniela Sosa MD 
10/05/18 0932



   



   -----------------------------------------------------------------------------
---------------



   



   



   



   



   



   ** END OF REPORT **



   



   DEPARTMENT OF PATHOLOGY,  [address] [place] [address]



   Phone # [phone]      Fax #[phone]



   Nayan Collins M.D. Director     [facility] # [id number]







Procedures







 Description

 

 No Information Available







Encounters







 Type  Date  Location  Provider  Dx  Diagnosis

 

 Office Visit  10/01/2018 10:30a  Main Office  Shawn Waters M.D.  H53.2  
Diplopia









 J31.0  Chronic rhinitis









 Office Visit  2013  9:00a  Main Office  Brittany Pack,  472.0  
Rhinitis Chronic



       [place]    









 995.3  Allergy Unspec

 

 372.14  Conjunctivitis Chronic Allergic Other









 Office Visit  2013  1:45p  Main Office  Yoly HICKS  478.0  Hypertrophy 
Nasal



       Joe, NP    Turbinates









 472.0  Rhinitis Chronic







Plan of Treatment

No Information Available

## 2018-11-06 ENCOUNTER — HOSPITAL ENCOUNTER (INPATIENT)
Dept: HOSPITAL 25 - ED | Age: 83
LOS: 3 days | Discharge: HOME | DRG: 62 | End: 2018-11-09
Attending: INTERNAL MEDICINE | Admitting: INTERNAL MEDICINE
Payer: MEDICARE

## 2018-11-06 DIAGNOSIS — G81.91: ICD-10-CM

## 2018-11-06 DIAGNOSIS — H53.2: ICD-10-CM

## 2018-11-06 DIAGNOSIS — Q21.1: ICD-10-CM

## 2018-11-06 DIAGNOSIS — Z88.0: ICD-10-CM

## 2018-11-06 DIAGNOSIS — N40.0: ICD-10-CM

## 2018-11-06 DIAGNOSIS — I63.9: Primary | ICD-10-CM

## 2018-11-06 DIAGNOSIS — Z79.02: ICD-10-CM

## 2018-11-06 DIAGNOSIS — R40.2252: ICD-10-CM

## 2018-11-06 DIAGNOSIS — R40.2362: ICD-10-CM

## 2018-11-06 DIAGNOSIS — Z87.01: ICD-10-CM

## 2018-11-06 DIAGNOSIS — Z82.49: ICD-10-CM

## 2018-11-06 DIAGNOSIS — Z79.82: ICD-10-CM

## 2018-11-06 DIAGNOSIS — I10: ICD-10-CM

## 2018-11-06 DIAGNOSIS — Z87.891: ICD-10-CM

## 2018-11-06 DIAGNOSIS — R47.1: ICD-10-CM

## 2018-11-06 DIAGNOSIS — Z80.9: ICD-10-CM

## 2018-11-06 DIAGNOSIS — E78.5: ICD-10-CM

## 2018-11-06 DIAGNOSIS — Z82.3: ICD-10-CM

## 2018-11-06 DIAGNOSIS — Z98.42: ICD-10-CM

## 2018-11-06 DIAGNOSIS — J44.9: ICD-10-CM

## 2018-11-06 DIAGNOSIS — R29.703: ICD-10-CM

## 2018-11-06 DIAGNOSIS — Z98.41: ICD-10-CM

## 2018-11-06 DIAGNOSIS — R40.2142: ICD-10-CM

## 2018-11-06 LAB
BASOPHILS # BLD AUTO: 0 10^3/UL (ref 0–0.2)
EOSINOPHIL # BLD AUTO: 0.1 10^3/UL (ref 0–0.6)
HCT VFR BLD AUTO: 44 % (ref 42–52)
HGB BLD-MCNC: 14.7 G/DL (ref 14–18)
INR PPP/BLD: 0.91 (ref 0.77–1.02)
LYMPHOCYTES # BLD AUTO: 1.6 10^3/UL (ref 1–4.8)
MCH RBC QN AUTO: 32 PG (ref 27–31)
MCHC RBC AUTO-ENTMCNC: 34 G/DL (ref 31–36)
MCV RBC AUTO: 94 FL (ref 80–94)
MONOCYTES # BLD AUTO: 0.5 10^3/UL (ref 0–0.8)
NEUTROPHILS # BLD AUTO: 3.8 10^3/UL (ref 1.5–7.7)
NRBC # BLD AUTO: 0 10^3/UL
NRBC BLD QL AUTO: 0.2
PLATELET # BLD AUTO: 173 10^3/UL (ref 150–450)
RBC # BLD AUTO: 4.64 10^6/UL (ref 4–5.4)
WBC # BLD AUTO: 6 10^3/UL (ref 3.5–10.8)

## 2018-11-06 PROCEDURE — 85027 COMPLETE CBC AUTOMATED: CPT

## 2018-11-06 PROCEDURE — 94640 AIRWAY INHALATION TREATMENT: CPT

## 2018-11-06 PROCEDURE — 70498 CT ANGIOGRAPHY NECK: CPT

## 2018-11-06 PROCEDURE — 86901 BLOOD TYPING SEROLOGIC RH(D): CPT

## 2018-11-06 PROCEDURE — 84100 ASSAY OF PHOSPHORUS: CPT

## 2018-11-06 PROCEDURE — 84484 ASSAY OF TROPONIN QUANT: CPT

## 2018-11-06 PROCEDURE — 85730 THROMBOPLASTIN TIME PARTIAL: CPT

## 2018-11-06 PROCEDURE — 70450 CT HEAD/BRAIN W/O DYE: CPT

## 2018-11-06 PROCEDURE — 86141 C-REACTIVE PROTEIN HS: CPT

## 2018-11-06 PROCEDURE — 70551 MRI BRAIN STEM W/O DYE: CPT

## 2018-11-06 PROCEDURE — 87641 MR-STAPH DNA AMP PROBE: CPT

## 2018-11-06 PROCEDURE — 93306 TTE W/DOPPLER COMPLETE: CPT

## 2018-11-06 PROCEDURE — 83036 HEMOGLOBIN GLYCOSYLATED A1C: CPT

## 2018-11-06 PROCEDURE — 80053 COMPREHEN METABOLIC PANEL: CPT

## 2018-11-06 PROCEDURE — 36415 COLL VENOUS BLD VENIPUNCTURE: CPT

## 2018-11-06 PROCEDURE — 93005 ELECTROCARDIOGRAM TRACING: CPT

## 2018-11-06 PROCEDURE — 81003 URINALYSIS AUTO W/O SCOPE: CPT

## 2018-11-06 PROCEDURE — 80048 BASIC METABOLIC PNL TOTAL CA: CPT

## 2018-11-06 PROCEDURE — 80061 LIPID PANEL: CPT

## 2018-11-06 PROCEDURE — 86850 RBC ANTIBODY SCREEN: CPT

## 2018-11-06 PROCEDURE — 83605 ASSAY OF LACTIC ACID: CPT

## 2018-11-06 PROCEDURE — 84443 ASSAY THYROID STIM HORMONE: CPT

## 2018-11-06 PROCEDURE — 86900 BLOOD TYPING SEROLOGIC ABO: CPT

## 2018-11-06 PROCEDURE — 71045 X-RAY EXAM CHEST 1 VIEW: CPT

## 2018-11-06 PROCEDURE — 85652 RBC SED RATE AUTOMATED: CPT

## 2018-11-06 PROCEDURE — 99285 EMERGENCY DEPT VISIT HI MDM: CPT

## 2018-11-06 PROCEDURE — 70496 CT ANGIOGRAPHY HEAD: CPT

## 2018-11-06 PROCEDURE — 85610 PROTHROMBIN TIME: CPT

## 2018-11-06 PROCEDURE — 83735 ASSAY OF MAGNESIUM: CPT

## 2018-11-06 PROCEDURE — 85025 COMPLETE CBC W/AUTO DIFF WBC: CPT

## 2018-11-06 NOTE — ED
Neurological HPI





- HPI Summary


HPI Summary: 





CODE GREY overhead at 09:22, ETA 10 minutes.


This pt is an 84 y/o male presenting to [facility] via EMS for right sided weakness 

today. Pt reports he woke up at 05:30 this morning. He states at around 07:45 

pt felt his right leg was asleep. He also notes having pressure on his right 

ankle. Pt tried to stand up but had difficulty with balance secondary to his 

leg. Denies difficulty with vision, difficulty with speech, chest pain, SOB.


Pt is currently on baby aspirin and antihypertensive medications. 





- History of Current Complaint


Stated Complaint: CODE GRAY


Hx Obtained From: Patient


Onset/Duration: Sudden Onset, Still Present


Timing: Sudden Onset


Current Severity: Moderate


Neurological Deficit Location: RLE


Character: Weak


Aggravating: Nothing


Alleviating: Nothing


Associated Signs and Symptoms: Positive: Weakness.  Negative: Visual Changes, 

Seizure, Impaired Speech, Nausea/Vomiting, Chest Pain, Shortness of Breath





- Allergy/Home Medications


Allergies/Adverse Reactions: 


 Allergies











Allergy/AdvReac Type Severity Reaction Status Date / Time


 


Penicillins Allergy  Anaphylatic Verified 10/10/18 12:33





   Shock  











Home Medications: 


 Home Medications





Albuterol HFA INHALER* [Ventolin HFA Inhaler*] 1 - 2 puff INH Q4H PRN 11/06/18 [

History Confirmed 11/06/18]


Aspirin [Aspirin EC] 81 mg PO DAILY 11/06/18 [History Confirmed 11/06/18]


Calcium Carbonate/Vitamin D3 [Calcium 600 + Vit D Tablet] 1 each PO BID 11/06/ 18 [History Confirmed 11/06/18]


Mometasone NASAL (NF) [Nasonex (NF)] 1 spray .SEE ORDER BID PRN MDD 2 11/06/18 [

History Confirmed 11/06/18]


Olopatadine 0.1% OPHTH (NF) [Patanol 0.1% OPHTH (NF)] 1 - 2 drop BOTH EYES BID 

PRN 11/06/18 [History Confirmed 11/06/18]


Tiotropium Brom/Olodaterol(NF) [Stiolto Respimat Inh Spray (60 puff)(NF)] 2 

puff INH DAILY 11/06/18 [History Confirmed 11/06/18]











PMH/Surg Hx/FS Hx/Imm Hx


Endocrine/Hematology History: 


   Denies: Hx Diabetes


Cardiovascular History: Reports: Hx Hypertension


Respiratory History: Reports: Hx Asthma, Hx Chronic Obstructive Pulmonary 

Disease (COPD), Hx Pneumonia


GI History: 


   Denies: Hx Ulcer


Neurological History: 


   Denies: Hx Transient Ischemic Attacks (TIA)


Psychiatric History: 


   Denies: Hx Schizophrenia





- Surgical History


Surgery Procedure, Year, and Place: Cataracts 10/15





- Family History


Known Family History: Positive: Hypertension





- Social History


Alcohol Use: None


Substance Use Type: Reports: None


Smoking Status (MU): Former Smoker





Review of Systems


Negative: Fever, Chills


Negative: Blurred Vision, Diplopia, Other - difficulty with vision


Negative: Chest Pain


Negative: Shortness Of Breath


Neurological: Other - NEG: difficulty with speech


Positive: Weakness - right sided


All Other Systems Reviewed And Are Negative: Yes





Physical Exam





- Summary


Physical Exam Summary: 





Appearance: Well appearing, no pain distress


Skin: warm, dry, reflects adequate perfusion


Head/face: normal


Eyes: EOMI, SHIVAM


ENT: normal


Neck: supple, non-tender


Respiratory: CTA, breath sounds present


Cardiovascular: RRR, pulses symmetrical 


Abdomen: non-tender, soft


Bowel: present


Musculoskeletal: normal, strength/ROM intact


Neuro: mild dysarthria, drift to the right leg


Triage Information Reviewed: Yes


Vital Signs On Initial Exam: 





 Initial Vitals











Temp Pulse Resp BP Pulse Ox


 


 98.4 F   82   16   173/85   91 


 


 11/06/18 09:38  11/06/18 09:38  11/06/18 09:38  11/06/18 09:38  11/06/18 09:38








Vital Signs Reviewed: Yes





- [place] Coma Scale


Best Eye Response: 4 - Spontaneous


Best Motor Response: 6 - Obeys Commands


Best Verbal Response: 5 - Oriented


Coma Scale Total: 15





Diagnostics





- Laboratory


Result Diagrams: 


 11/06/18 09:52





 11/06/18 09:52


Lab Statement: Any lab studies that have been ordered have been reviewed, and 

results considered in the medical decision making process.





- Radiology


  ** Chest XR


Radiology Interpretation Completed By: Radiologist


Summary of Radiographic Findings: IMPRESSION:  Findings suggestive of COPD, no 

evidence for acute finding.  Dr. Limon has reviewed this report.





- CT


  ** Brain CT


CT Interpretation Completed By: Radiologist


Summary of CT Findings: IMPRESSION: CT findings are consistent with 

microvascular disease and age indeterminant lacunar infarctions at the 

bilateral basal ganglia. There are punctate hyperattenuating foci in the 

subarachnoid area of the left occipital lobe as well as punctate 

hyperattenuating focus at the corticomedullary junction of the left parietal 

lobe with Hounsfield units measuring greater than 70 and therefore most likely 

calcium as opposed to acute hemorrhage.  Imaging findings were reviewed with a 

colleage radiologist before conveying the above results to Dr. Vergara over the 

telephone at 1002 hours on November 06, 2018.  Dr. Limon has reviewed this 

report.





  ** CTA Head/Neck


CT Interpretation Completed By: Radiologist


Summary of CT Findings: IMPRESSION:  1. Atherosclerosis.  2. No internal 

carotid artery stenosis by nascet criteria.  3. No aneurysm, vascular 

malformation, occlusion, or stenosis of the visualized intracranial 

circulation.  Dr. Limon has reviewed this report.





- EKG


  ** 09:56


Cardiac Rate: NL - at 85 bpm


EKG Rhythm: Sinus Rhythm


ST Segment: Normal


Summary of EKG Findings: Normal axis. Normal interval. Normal ST.





NIH Scale





- NIH Scale


Level of Consciousness: Alert/Keenly Responsive


Ask Patient the Month and His/Her Age: Both Correct


Ask Pt to Open/Close Eyes and /Release Non-Paretic Hand: Both Correctly


Best Gaze (Only Horizontal Eye Movement): Normal


Visual Field Testing: No Visual Loss


Facial Paresis-Pt to Smile & Close Eyes or Grimace Symmetry: Normal/Symmetrical


Motor Function - Right Arm: No Drift-Holds 10 Seconds


Motor Function - Left Arm: No Drift-Holds 10 Seconds


Motor Function - Right Leg: Drifts LT 10 seconds


Motor Function - Left Leg: No Drift-Holds 10 Seconds


Limb Ataxia-Must be out of Proportion to Weakness Present: Present in One Limb


Sensory (Use Pinprick to Test Arms/Legs/Trunk/Face): Normal


Best Language (Describe Picture, Name Items): Some Loss


Dysarthria (Read Several Words): Normal


Extinction and Inattention: No Abnormality


Total Score: 3





Re-Evaluation





- Re-Evaluation


  ** First Eval


Re-Evaluation Time: 09:39


Comment: Consult initiated immediately upon arrival by Dr. Vergara at 09:40.





  ** Second Eval


Re-Evaluation Time: 10:40


Comment: NIH is now 0 after receiving tPA.





Course/Dx





- Course


Course Of Treatment: Door to MD at 0937. Consult initiated immediately by Dr. Vergara.  Door to CT at 0941. TPA bolus infused at 0958. Door to CT read at 

1001. Infusion of TPA started at 1000.  Order CTA per Dr. Vergara at 1002.  

Patient presented with right-sided stroke symptoms that began at 7:45 AM.  He 

was inside the window for TPA infusion.  He had no contraindications for this.  

A CT of the head was performed and there were concerns for a tiny punctate 

areas of calcification versus hemorrhage.  This is read as negative for 

hemorrhage by radiology.  Neurologist is at the bedside and TPA was started.  

The patient's right-sided symptoms quickly improved.  The ICU physician came to 

the bedside to evaluate.  The patient will be admitted to the ICU.  His blood 

pressure never required treatment as it remained under 180.





- Differential Dx


Differential Diagnoses Neuro: Positive: Other - Ischemic stroke, hemorrhagic 

stroke, uncontrolled blood pressure





- Diagnoses


Provider Diagnoses: 


 Acute CVA (cerebrovascular accident), Elevated blood pressure reading





During the Visit The Following Alert/Code Occurred: Code Grey - overhead at 09:

22, ETA 10 minutes





- Physician Notifications


Discussed Care Of Patient With: Oscar Calvillo


Time Discussed With Above Provider: 10:41


Instructed by Provider To: Admit As Inpatient





- Critical Care Time


Critical Care Time: 30-74 min - 30 minutes - CCT is EXCLUSIVE of separately 

billable procedures





Discharge





- Sign-Out/Discharge


Documenting (check all that apply): Patient Departure - Admit to [facility]


All imaging exams completed and their final reports reviewed: Yes





- Discharge Plan


Condition: Guarded


Disposition: ADMITTED TO [facility]





- Billing Disposition and Condition


Condition: GUARDED


Disposition: Admitted to [facility]





- Attestation Statements


Document Initiated by Scribe: Yes


Documenting Scribe: Caty Burns


Provider For Whom Scribe is Documenting (Include Credential): Archie Limon MD


Scribe Attestation: 


Caty FISHER, scribed for Archie Limon MD on 11/06/18 at 1401. 


Scribe Documentation Reviewed: Yes


Provider Attestation: 


The documentation as recorded by the Caty gonzales accurately reflects 

the service I personally performed and the decisions made by me, Archie Limon MD

## 2018-11-06 NOTE — CONS
CONSULTATION REPORT:

 

DATE OF CONSULT:  11/06/18

 

PATIENT OF:  Dr. Calvillo and Dr. Limon.

 

HISTORY OF PRESENT ILLNESS:  This is an 83-year-old right-handed man, who woke 
up in his usual state of health at about 5:30 this morning.  At 7:45, he felt 
his leg was asleep and had difficulty with balance and standing on that leg.  
Symptoms then improved, but then at 8:45, he got worse and had difficulty with 
weakness in his right leg and arm.  He was brought in by ambulance as a code 
gray and was brought in at 9:37, was evaluated immediately by Dr. Limon and 
myself.  CT scan was verbally told me by Dr. Fitzgerald and Dr. Islas at 9:54 and I 
reviewed the films with them.  It was unclear who is going to officially read 
the films.  Dr. Lee was called with the preliminary and then rechecked the 
results with Dr. Fitzgerald and called back at 10:00.

 

He has had no prior stroke or focal neurological deficits.  He had some double 
vision about a month ago that passed.  He has had no recent surgeries.  He is 
on no blood thinners.

 

PAST MEDICAL HISTORY:  He has a history of hypertension.  He has had a history 
of asthma and COPD.  He has had no hyperlipidemia.

 

MEDICATIONS:  Include:

1.  Ventolin inhaler 2 puffs as needed.

2.  Aspirin 81 mg daily.

3.  Calcium carbonate 600/vitamin D 1 tab b.i.d.

4.  Nasonex spray b.i.d.

5.  Eye drops.

 

FAMILY HISTORY:  Significant for hypertension.

 

SOCIAL HISTORY:  He is a former smoker, but does not smoke now.  No alcohol use.

 

REVIEW OF SYSTEMS:  Negative in all 14 spheres, other than HPI.  There have 
been no speech problems with this.  There has been some numbness in the right 
leg.

 

PHYSICAL EXAM:  I evaluated him at 9:37 until the 10 and again at 12:15 today. 
Initial Vitals:  Temperature 98.4, pulse 82, respirations 16, blood pressure 173
/85, pulse ox was 91%.  On exam, he is alert and oriented with normal speech 
and comprehension other than he had mild dysarthria when he first came in.  
Cranial nerves II through XII were intact.  Fundi was benign.  Motor exam 
revealed normal tone and strength currently.  When he first came in, he had 
drift of his right leg at 10 seconds.  No drift of his arms or his left leg.  
He had limb ataxia in his right leg.  Total NIH Stroke Scale was 3.  His 
strength was 5/5 other than the drift in his leg.  Sensation was intact to 
touch and pin.  Chest:  Clear. Cardiovascular:  Regular rate and rhythm.  
Abdomen was soft with positive bowel sounds.  His initial NIH Stroke Scale was 
3.  His symptoms appeared to be resolved now following t-PA.

 

His t-PA boluses was infused at 9:58.

 

DIAGNOSTIC STUDIES/LAB DATA:  His CT scan, I reviewed and had it reviewed by 
the radiologist, because of small what appeared to be spots of calcium 
including in his left posterior parietal lobe.  This was read as calcium by Dr. Fitzgerald and not hemorrhage.  He also had some evidence of old lacunar infarcts 
in his bilateral basal ganglia.

 

His CTA showed atherosclerosis, but no other findings.

 

Initial blood work included normal CBC, INR, PTT.  His LDL was 55.  Glucose was 
105.

 

IMPRESSION AND PLAN:  Abdoul had a stroke involving leg more than arm, but his 
arm was involved at one point.  He received t-PA within 20 minutes or so of 
arrival and he is clinically asymptomatic at this point.  I have discussed the 
risks of bleeding with him from the t-PA before the t-PA was infused.  His CTA 
is negative for large vessel occlusion, so he is being admitted to the ICU and 
will be observed and get an MRI scan tomorrow morning roughly 24 hours after 
his t-PA and then, he will be started presumably on antiplatelet therapy.  We 
will get a repeat LDL tomorrow before making decision of whether to treat him 
with ongoing statins.

 

Thank you for sharing his case.

 

 [id number]/[facility] #: [id number]

LENIN

## 2018-11-06 NOTE — XMS REPORT
Continuity of Care Document (CCD)

 Created on:2018



Patient:Abdoul Feliz

Sex:Male

:1935

External Reference #:[id number]





Demographics







 Address  [address] [place] [address]

 

 Home Phone  [phone]

 

 Mobile Phone  [phone]

 

 Preferred Language  en

 

 Marital Status  Not  or 

 

 [employer] Affiliation  Unknown

 

 Race  White

 

 Ethnic Group  Not  or 









Author







 Name  Joi Orellana









Care Team Providers







 Name  Role  Phone

 

 Csasia Rodas M.D.  Care Team Information   Unavailable

 

 Kenia Durham PA-C  Primary Care Physician  Unavailable









Payers







 Type  Date  Identification Numbers  Payment Provider  Subscriber

 

     Policy Number: [id number]  Medicare  Abdoul Feliz









 PayID: [id number]  [address]









 [facility] [place] [address]









   Effective: 2012  Policy Number: [id number]  [facility]  Abdoul Feliz









 PayID: [id number]  [address] [address]









 [place] [address]







Advance Directives







 Description

 

 No Information Available







Problems







 Description

 

 No Information







Family History







 Date  Family Member(s)  Problem(s)  Comments

 

   General  neck cancer-sister  

 

 :  (age 65 Years)  Father   due to Unknown Causes  

 

 :  (age 72 Years)  Mother   due to Cancer  







Social History







 Type  Date  Description  Comments

 

 Birth Sex    Unknown  

 

 Marital Status      

 

 Occupation    Retired  

 

 Occupation      

 

 Tobacco Use  Start: Unknown End:  Used To Smoke Cigarettes But  



   Unknown  Quit.  

 

 ETOH Use    Rarely consumed alcohol in  



     the past  

 

 Recreational Drug Use    Never Used Drugs  







Allergies, Adverse Reactions, Alerts







 Date  Description  Reaction  Status  Severity  Comments

 

 2013  Penicillin    Active    

 

 2013  Bee Sting    Active    

 

 10/01/2018  Ampicillin    Active    







Medications







 Medication  Date  Status  Form  Strength  Qnty  SIG  Indications  Ordering



                 Provider

 

 Percocet  10/03/  Active  Tablets  5-325mg  14tab  1-2 by    Jules Waters        s  mouth    Shawn,



             four    M.D.



             times a    



             day as    



             needed    



             for pain    

 

 Advair Diskus  /  Active  Aerosol      1 puff    Unknown



   0000          twice a    



             day    

 

 Vitamin C  /  Active  Tablets      1 by    Unknown



   0000          mouth    



             every day    

 

 Multivitamins  /  Active  Capsules      1 a day    Unknown



   0000              

 

 Calcium + D  /  Active  Chewtabs          Unknown



   0000              

 

 Montelukast  /  Active  Tablets  10mg    1 by    Unknown



 Sodium  0000          mouth    



             every day    

 

 Avodart  00/  Active  Capsules          Unknown



   0000              

 

 Olopatadine HCL  /  Active  Solution          Unknown



   0000              

 

 Levocetirizine  /  Active  Tablets  5mg    1 by    Unknown



 Dihydrochloride  0000          mouth    



             every day    

 

 Spiriva  /  Active  Capsules  18mcg    1 by    Unknown



 Handihaler  0000          mouth    



             every day    

 

 Atorvastatin  /  Active  Tablets      1 by    Unknown



 Calcium  0000          mouth    



             every day    

 

 Aspirin  /  Active  Tablets DR  81mg    everyday    Unknown



   0000              

 

 Lisinopril-Hydroc  /  Active  Tablets      1 by    Unknown



 hlorothiazide  0000          mouth    



             every day    

 

 Proair HFA  /  Active  Aerosol  108(90Bas    2puffs    Unknown



   0000      e)    four    



         mcg/Act    times a    



             day as    



             needed    



             for sob    

 

 Mometasone  /  Active  Suspension      2 squirts    Unknown



 Furoate  0000          each    



             nostril    



             every day    

 

                 

 

 Cetirizine HCL  /  Hx  Tablets  10mg  90tab  1/2-1 po    Jt,



   2013 -        s  qd as    Shawn,



   /          needed    M.D.



   2018              

 

 Patanol  /  Hx  Solution  0.1%  3bott  1 drop    Jt,



   2013 -        le  each eye    Shawn,



   /          twice    M.D.



   2018          daily for    



             itching    

 

 Astelin  /  Hx  Solution  137mcg/Sp  3unit  2 sprays    Jt,



   2013 -      ray  s  each    Shawn,



   /          nostil    M.D.



   2018          qhs    

 

 Hydrocortisone  /  Hx  Cream  2.5%    tid to    Unknown



   0000 -          affected    



   /          area    



                 

 

 Advair Diskus  /  Hx  Aerosol  500-50mcg  1unit  1 puff    Unknown



   0000 -      /Dose  s  twice    



   /          daily    



   2018              

 

 Advair Diskus  00/00/  Hx  Aerosol  100-50mcg  1unit  1 puff    Unknown



   0000 -      /Dose  s  bid    



   2018              

 

 Lisinopril  /  Hx  Tablets  10mg        Unknown



    -              



   2018              

 

 Multivitamin  //  Hx        qd    Unknown



    -              



   2018              

 

 Vitamin C  /  Hx    250mg        Unknown



    -              



   2018              

 

 Aspirin  /  Hx  Tablets DR  81mg    qday    Unknown



    -              



   2018              

 

 Avodart  /  Hx  Capsules  0.5mg        Unknown



    -              



   2018              

 

 Calcium  /  Hx    600mg        Unknown



   0000 -              



   2018              







Immunizations







 Description

 

 No Information Available







Vital Signs







 Date  Vital  Result  Comment

 

 10/10/2018 10:22am  Weight  188.00 lb  









 Height  68.5 inches  5'8.50"

 

 BMI (Body Mass Index)  28.2 kg/m2  

 

 BP Systolic  165 mmHg  

 

 BP Diastolic  50 mmHg  

 

 Heart Rate  91 /min  

 

 O2 % BldC Oximetry  92 %  

 

 Body Temperature  98.0 F  

 

 Pain Level  0  









 10/01/2018 10:37am  Weight  189.00 lb  









 Height  68.5 inches  5'8.50"

 

 BMI (Body Mass Index)  28.3 kg/m2  

 

 BP Systolic  146 mmHg  

 

 BP Diastolic  58 mmHg  

 

 Heart Rate  72 /min  

 

 O2 % BldC Oximetry  92 %  

 

 Body Temperature  97.6 F  

 

 Pain Level  0  









 2013  9:10am  Weight  198.00 lb  









 Height  68.5 inches  5'8.50"

 

 BMI (Body Mass Index)  29.7 kg/m2  

 

 BP Systolic  144 mmHg  

 

 BP Diastolic  60 mmHg  

 

 Body Temperature  98.9 F  









 2013  2:02pm  Weight  201.00 lb  









 Height  68.5 inches  5'8.50"

 

 BMI (Body Mass Index)  30.1 kg/m2  

 

 BP Systolic  137 mmHg  

 

 BP Diastolic  68 mmHg  

 

 Heart Rate  78 /min  

 

 O2 % BldC Oximetry  96 %  

 

 Body Temperature  97.9 F  







Results







 Test  Date  Facility  Test  Result  H/L  Range  Note

 

 Laboratory test  10/10/2018  [facility]  Sedimentation Rate  22 mm/
hr  High  0-20  1, 2



 finding    [address] [place] [address] [phone]          

 

 Laboratory test  10/03/2018  [facility]  Surgical Pathology  SEE 
RESULT      3



 finding    [address]    [place]      



     [place] [address]          









 1  H53.2, G93.81,

 

 2  Method: Sediplast Modified Vicenta

 

 3  SEE RESULT BELOW



   -----------------------------------------------------------------------------
---------------



   Name:  ABDOUL FELIZ                  : 1935    Attend Dr: Shawn Waters MD



   Acct:  [id number]  Unit: [id number]  AGE: 83            Location:  [facility]



   Reg:   10/03/18                        SEX: M             Status:    REG REF



   -----------------------------------------------------------------------------
---------------



   



   SPEC: [id number]            ADELAIDE: 10/03/      [facility] DR: Shawn Waters MD



   REQ:  [id number]             RECD: 10/03/



   STATUS: SOUT



   _



   ORDERED:  LEVEL 4



   COMMENTS: [id number]



   



   FINAL DIAGNOSIS



   



   



   Temporal artery, right, biopsy:



   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 
inflammation;



   see comment.



   



   



   -----------------------------------------------------------------------------
---------------



   



   COMMENT:



   Histologic sections show benign arterial tissue with an intact



   internal elastic lamina and no evidence of medial



   inflammation.  A small feeder vessel shows a mild perivascular



   lymphoid infiltrate in some levels, a non-specific finding,



   but one that may be seen in partially treated temporal



   arteritis.



   



   CLINICAL HISTORY



   



   No history given



   



   GROSS DESCRIPTION



   



   The specimen is received in formalin labeled, Right Temporal Artery Biopsy, 
and consists of



   a 2.7 by up to 0.2 cm tan-gray tubular soft tissue fragment with scant 
adherent yellow fat.



   The specimen is serially sectioned and entirely submitted in one cassette.



   



   Signed by and Reported on: __________              Daniela Sosa MD 
10/05/18 0932



   



   -----------------------------------------------------------------------------
---------------



   



   



   



   



   



   ** END OF REPORT **



   



   DEPARTMENT OF PATHOLOGY,  [address] [place] [address]



   Phone # [phone]      Fax #[phone]



   Nayan Collins M.D. Director     [facility] # [id number]







Procedures







 Description

 

 No Information Available







Encounters







 Type  Date  Location  Provider  Dx  Diagnosis

 

 Office Visit  10/01/2018 10:30a  Main Office  Shawn Waters M.D.  H53.2  
Diplopia









 J31.0  Chronic rhinitis









 Office Visit  2013  9:00a  Main Office  Brittany Pack,  472.0  
Rhinitis Chronic



       PA    









 995.3  Allergy Unspec

 

 372.14  Conjunctivitis Chronic Allergic Other









 Office Visit  2013  1:45p  Main Office  Yoly HICKS  478.0  Hypertrophy 
Nasal



       CEE Joe    Turbinates









 472.0  Rhinitis Chronic







Plan of Treatment

Future Appointment(s):2018  1:15 pm - Yoly Joe NP at Main Office

## 2018-11-06 NOTE — HP
H&P (Free Text)


History and Physical: 





History and Physical -- Critical Care








HPI: 83y M w/pmhx of HTN, asthma/COPD; who comes to ER for sudden weakness in 

legs this morning at ~745am, some more right leg weakness noted. no falls/

syncope. no slurred speech as per him. no confusion. Also noted right arm 

weakness. no cp/sob/n/v/abd pain. no blurred vision. comes to ER at ~938, seen 

by ER, Stroke code called, stat CT brain done demonstrating no acute hemorrhage

, likely areas of calcium only. BP 200s initially, then repeats to 160-180s, 

sinus rhythm. NIHSS 3 on arrival with right leg weakness, some dysarthria, some 

fingerpointing abn. Determined to be a candidate for tpa, started infusion at 

958am. Currently in bed, awake/alert, feels better, moving all ext. Last vitals 

160s systolic, HR 80s, NSR, on RA, no distress. No prior stroke history. 

MEdical history as above. Last NIHSS 0








ROS: negative except for pertinent positives mentioned above.  NO limitations 

to history.





PMHx: HTN, asthma/COPD





PSHx: cataract surgery





Family History: Hypertension





Social History: Alcohol-none, Smoking-former smoker, Drug use-none





Allergies: 


 Allergies











Allergy/AdvReac Type Severity Reaction Status Date / Time


 


Penicillins Allergy  Anaphylatic Verified 10/10/18 12:33





   Shock  














Home Medications: Vit C 500mg daily, calcium 600+D 600-200mg bid, spiriva 10mg 

daily, lipitor 10mg daily, ventolin hfa 1-2puff/q4-6h prn, dutasteride 0.5mg 

daily, levoceterizine 5mg daily, olopatadine 1-2 drops each eye bid, MVI daily, 

asa 81 daiily, lisinopril/hctz 20-25mg daily, nasonex 50mcg each nostril 1-2x 

daily, stiolto respimate 2.5-2.5 2 puffs daily





Tele: NSR





Vitals: 


 Vital Signs











Temp  98.4 F   11/06/18 09:38


 


Pulse  82   11/06/18 09:38


 


Resp  16   11/06/18 09:38


 


BP  173/85   11/06/18 09:38


 


Pulse Ox  91   11/06/18 09:38








 Intake & Output











 11/05/18 11/06/18 11/06/18





 18:59 06:59 18:59


 


Weight   89.811 kg














O2/Vent: RA





Infusions: heplock





Current Medications: 


Albuterol (Ventolin Hfa Inhaler*)  1 puff INH Q4H PRN


   PRN Reason: SOB/WHEEZING


Atorvastatin Calcium (Lipitor*)  80 mg PO ONCE ONE


   Stop: 11/06/18 12:03


Atorvastatin Calcium (Lipitor*)  80 mg PO 1700 MICHELLE


Non-Formulary Medication (Tiotropium Brom/Olodaterol(Nf) [Stiolto Respimat Inh 

Spray (60 Puff)(Nf)])  2 puff INH DAILY MICHELLE











Physical Exam:


General: awake, alert, no distress, no diaphoresis


Head: normocephalic, atraumatic


HEENT: no pallor, no icterus, moist mucous membranes


Neck: soft, supple, no jvd, no stridor


CVS: normal rate, regular, no murmur


Resp: bilateral air entry, no rhales, no wheeze, no rhonchi, no acc muscle use


Abdomen: soft, nontender, nondistended, bowel sounds present


Ext: pulses+, warm, no edema


Skin: intact


Neuro: awake, alert, orientedx3, Right/Left arm 5/5, Left arm/leg 5/5, no 

dysarthria, CN intact








Labs: 


 Laboratory Results - last 24 hr











  11/06/18 11/06/18 11/06/18





  09:52 09:52 09:52


 


WBC  6.0  


 


RBC  4.64  


 


Hgb  14.7  


 


Hct  44  


 


MCV  94  


 


MCH  32 H  


 


MCHC  34  


 


RDW  14  


 


Plt Count  173  


 


MPV  8.3  


 


Neut % (Auto)  63.3  


 


Lymph % (Auto)  27.0  


 


Mono % (Auto)  8.3 H  


 


Eos % (Auto)  1.1  


 


Baso % (Auto)  0.3  


 


Absolute Neuts (auto)  3.8  


 


Absolute Lymphs (auto)  1.6  


 


Absolute Monos (auto)  0.5  


 


Absolute Eos (auto)  0.1  


 


Absolute Basos (auto)  0  


 


Absolute Nucleated RBC  0  


 


Nucleated RBC %  0.2  


 


INR (Anticoag Therapy)   0.91 


 


APTT   30.4 


 


Sodium    140


 


Potassium    4.2


 


Chloride    103


 


Carbon Dioxide    31


 


Anion Gap    6


 


BUN    18


 


Creatinine    0.99


 


Est GFR ( Amer)    87.4


 


Est GFR (Non-Af Amer)    72.2


 


BUN/Creatinine Ratio    18.2


 


Glucose    105 H


 


Lactic Acid   


 


Calcium    9.6


 


Total Bilirubin    0.50


 


AST    19


 


ALT    19


 


Alkaline Phosphatase    81


 


Troponin I    0.00


 


Total Protein    6.9


 


Albumin    4.2


 


Globulin    2.7


 


Albumin/Globulin Ratio    1.6


 


Triglycerides    188


 


Cholesterol    142


 


LDL Cholesterol    55


 


HDL Cholesterol    49.2


 


Blood Type   


 


Antibody Screen   














  11/06/18 11/06/18





  09:52 09:52


 


WBC  


 


RBC  


 


Hgb  


 


Hct  


 


MCV  


 


MCH  


 


MCHC  


 


RDW  


 


Plt Count  


 


MPV  


 


Neut % (Auto)  


 


Lymph % (Auto)  


 


Mono % (Auto)  


 


Eos % (Auto)  


 


Baso % (Auto)  


 


Absolute Neuts (auto)  


 


Absolute Lymphs (auto)  


 


Absolute Monos (auto)  


 


Absolute Eos (auto)  


 


Absolute Basos (auto)  


 


Absolute Nucleated RBC  


 


Nucleated RBC %  


 


INR (Anticoag Therapy)  


 


APTT  


 


Sodium  


 


Potassium  


 


Chloride  


 


Carbon Dioxide  


 


Anion Gap  


 


BUN  


 


Creatinine  


 


Est GFR ( Amer)  


 


Est GFR (Non-Af Amer)  


 


BUN/Creatinine Ratio  


 


Glucose  


 


Lactic Acid  1.7 


 


Calcium  


 


Total Bilirubin  


 


AST  


 


ALT  


 


Alkaline Phosphatase  


 


Troponin I  


 


Total Protein  


 


Albumin  


 


Globulin  


 


Albumin/Globulin Ratio  


 


Triglycerides  


 


Cholesterol  


 


LDL Cholesterol  


 


HDL Cholesterol  


 


Blood Type   O Positive


 


Antibody Screen   Negative

















Imaging: 


CT brain 11/6 - no acute process; small arease of calcium noted (report reviewed

)








Assessment: 83y M w/pmhx of HTN, asthma/COPD; who comes to ER for sudden 

weakness in legs this morning at ~745am, some more right leg weakness noted. no 

falls/syncope. no slurred speech as per him. no confusion. Also noted right arm 

weakness. no cp/sob/n/v/abd pain. no blurred vision. comes to ER at ~938, seen 

by ER, Stroke code called, stat CT brain done demonstrating no acute hemorrhage

, likely areas of calcium only. BP 200s initially, then repeats to 160-180s, 

sinus rhythm. NIHSS 3 on arrival with right leg weakness, some dysarthria, some 

fingerpointing abn. Determined to be a candidate for tpa, started infusion at 

958am. Currently in bed, awake/alert, feels better, moving all ext. Last vitals 

160s systolic, HR 80s, NSR, on RA, no distress. No prior stroke history. 

MEdical history as above. Last NIHSS 0





CVA; s/p tpa 11/6


Hypertension


COPD








Plan:


Neuro- s/p tpa; some improvement in focal deficit. clear liquid if no change in 

neuro status. CT brain without acute process. Repeat CT brain if any change in 

NIHSS. Neuro consult. MRI brain tomoorrow? Neurochecks as per protocol. 

Maintain SBP <180, cardene PRN if needed. TTE. Carotid duplex. Tele monitoring 

for arrythmias. Statin today, asa tomorrow. bedrest. no blood draws unless 

needed.





CVS- maintain BP <180 s/p tpa. cardene prn. hold po meds for now. start statin.





Resp- RA, no distress. COPD, bronchodilators PRN.





ID- afebrile. wbc normal. monitor off abx.


GI- clear liquid diet; npo if change in neuro status.


Renal- CR normal. no limon. monitor urine output


Heme- hg stable. on asa at home. plt count okay. s/p tPA for CVA, monitor for 

bleeding.


Endo- check hba1c and tsh


Musculsk- pressure ulcer prophylaxis. Bedrest.


Wounds- none


Nutrition- clear liqudid diet





DVT prophylaxis: SCDs


GI prophylaxis: -


Central Line:- 


Arterial Line: -


Limon Cathetor: -





Disposition: ICU





Code Status: full code





Total Critical Care time is 40 minutes, excluding procedures/teaching





Oscar Calvillo MD


Intensivist


(Electronically Signed)

## 2018-11-06 NOTE — ECHO
Patient:      DEBBIE BARNES

Med Rec#:     [id number]            :          1935          

Date:         2018            Age:          83y                 

Account#:     [id number]          Height:       180.34 cm / 71.0 in

Accession#:   [id number]           Weight:       89.81 kg / 197.9 lbs

Sex:          M                     BSA:          2.1

Room#:        ICU 2                 

Admit Date#:  2018          

Type:         Inpatient

 

Referring:    Oscar Calvillo

Reading:      Qutaybeh Maghaydah, MD

Sonographer:  Trina Armendariz,RONELCS,RDMS

CC:           Kalee MARIE,Jasiel

______________________________________________________________________

 

Transthoracic Echocardiogram

 

Indication:

CVA

BP:           170/85

HR:           69

Rhythm:       NSR

 

Findings     

History:

HTN, COPD 

 

Technical Comments:

The study is technically limited due to the patient's history of COPD. 

 

 

Left Ventricle:

The left ventricular chamber size is normal. Mild concentric left

ventricular hypertrophy is observed. Global left ventricular wall motion

and contractility are within normal limits. The estimated ejection

fraction is 55-60%.  There is an E to A reversal in the mitral valve

flow pattern suggestive of diastolic dysfunction. 

 

Left Atrium:

The left atrial chamber size is normal. 

 

Right Ventricle:

The right ventricular chamber size and systolic function are within

normal limits. 

 

Right Atrium:

The right atrium is slightly dilated.  There is a patent foramen ovale

with predominant right-to-left shunting. A patent foramen ovale is

demonstrated by agitated contrast.  

 

Aortic Valve:

The aortic valve is trileaflet. The aortic valve leaflets are mildly

thickened. There is no evidence of aortic regurgitation. There is no

evidence of aortic stenosis. 

 

Mitral Valve:

The mitral valve leaflets are mildly thickened. There is no evidence of

mitral regurgitation. There is no evidence of mitral stenosis. 

 

Tricuspid Valve:

The tricuspid valve leaflets are normal.  There is trace tricuspid

regurgitation.  Unable to estimate the right ventricular systolic

pressure.   

 

Pulmonic Valve:

The pulmonic valve appears normal. There is no evidence of pulmonic

regurgitation. 

 

Pericardium:

There is no significant pericardial effusion. 

 

Aorta:

The ascending aorta is not well visualized. The aortic arch is not well

visualized.  The aortic root is normal in size. 

 

Pulmonary Artery:

The main pulmonary artery is not well visualized. 

 

Venous:

The inferior vena cava is dilated.  There is a greater than 50%

respiratory change in the inferior vena cava dimension. 

 

Contrast:

Intravenous agitated saline contrast was used to assess intracardiac

shunting.  

 

Summary:

There are changes noted when compared to the previous study done on

2007, EF is now 55-60% instead of 50-55% then. Bubble PFO is new

(bubble study was not done then). 

 

Conclusions

The left ventricular chamber size is normal.

The estimated ejection fraction is 55-60%. 

There is an E to A reversal in the mitral valve flow pattern suggestive

of diastolic dysfunction.

There is a patent foramen ovale with predominant right-to-left

shunting.

A patent foramen ovale is demonstrated by agitated contrast. 

There is trace tricuspid regurgitation. 

 

Measurements     

Name                    Value         Normal Range            

RVDdMajor (2D)          2.9 cm        (2.2 - 4.4)             

RAd ISD 4CH             4.2 cm        (3.4 - 4.9)             

RA (A4C)W               4.8 cm        (2.9 - 4.6)             

IVSd (2D)               1.1 cm        (0.6 - 1)               

LVPWd (2D)              1.4 cm        (0.6 - 1)               

LVIDd (2D)              4.7 cm        (3.6 - 5.4)             

LVIDs (2D)              2.7 cm        -                        

LV FS (2D)              42 %          (25 - 45)               

EF Teichholz (2D)       73 %          -                        

Aortic Annulus          2.6 cm        (1.4 - 2.6)             

Ao root diameter (2D)   3.5 cm        (2.1 - 3.5)             

LA dimension (AP) 2D    2.8 cm        (2.3 - 3.8)             

LAd ISD 4CH             3.7 cm        (2.9 - 5.3)             

LA ISD 4CH W            3.9 cm        (2.5 - 4.5)             

 

Name                    Value         Normal Range            

LA ESV SP 4CH (A/L)     42.74 ml      -                        

LA ESV SP 4CH (MOD)     40.13 ml      -                        

 

Name                    Value         Normal Range            

MV E-wave Vmax          0.6 m/sec     -                        

MV deceleration time    240 msec      -                        

MV A-wave Vmax          0.7 m/sec     -                        

MV E:A ratio            0.8 ratio     -                        

LV septal e' Vmax       0.09 m/sec    -                        

LV lateral e' Vmax      0.08 m/sec    -                        

LV E:e' septal ratio    7 ratio       -                        

LV E:e' lateral ratio   7.5 ratio     -                        

 

Name                    Value         Normal Range            

AV Vmax                 1.2 m/sec     -                        

AV VTI                  25 cm         -                        

AV peak gradient        6 mmHg        -                        

AV mean gradient        3.2 mmHg      -                        

LVOT Vmax               0.9 m/sec     -                        

LVOT VTI                20 cm         -                        

LVOT peak gradient      3.2 mmHg      -                        

LVOT mean gradient      1.6 mmHg      -                        

MEI Vmax                0.4 m/sec     -                        

 

Name                    Value         Normal Range            

RAP                     8 mmHg        -                        

IVC diameter            2.4 cm        -                        

 

Name                    Value         Normal Range            

PV Vmax                 1.1 m/sec     -                        

PV peak gradient        5 mmHg        -                        

 

Electronically signed by: Qutaybeh Maghaydah, MD on 2018 15:51:07

## 2018-11-07 RX ADMIN — ASPIRIN SCH MG: 81 TABLET, COATED ORAL at 10:03

## 2018-11-07 RX ADMIN — FORMOTEROL FUMARATE DIHYDRATE SCH MCG: 20 SOLUTION RESPIRATORY (INHALATION) at 20:10

## 2018-11-07 RX ADMIN — TIOTROPIUM BROMIDE SCH CAP: 18 CAPSULE ORAL; RESPIRATORY (INHALATION) at 13:42

## 2018-11-07 RX ADMIN — ATORVASTATIN CALCIUM SCH MG: 40 TABLET, FILM COATED ORAL at 17:34

## 2018-11-07 NOTE — PN
Progress Note





- Progress Note


Date of Service: 11/07/18


Note: 





Progress Note -- Critical Care





24 hour events:


-no events on tele overnight


-awake/alert; no bleeding; moving all ext; complaints of no weakness/numbness/

dizziness/headache


-tolerating liquid diet po


-BP has been 140-150s, sometimes lower; no cardene required





Tele: NSR





Vitals: 


 Vital Signs











Temp  98.0 F   11/07/18 08:00


 


Pulse  69   11/07/18 10:01


 


Resp  21   11/07/18 10:01


 


BP  122/59   11/07/18 10:19


 


Pulse Ox  93   11/07/18 10:01








 Intake & Output











 11/06/18 11/07/18 11/07/18





 18:59 06:59 18:59


 


Intake Total 600 960 


 


Output Total 200 975 


 


Balance 400 -15 


 


Weight 84 kg 84.1 kg 


 


Intake:   


 


  Oral 600 960 


 


Output:   


 


  Urine 200 975 

















O2/Vent: RA





Infusions: heplock





Current Medications: 


Albuterol (Ventolin Hfa Inhaler*)  1 puff INH Q4H PRN


   PRN Reason: SOB/WHEEZING


Aspirin (Aspirin Ec Tab*)  81 mg PO DAILY [facility]


   Last Admin: 11/07/18 10:03 Dose:  81 mg


Atorvastatin Calcium (Lipitor*)  80 mg PO 1700 MICHELLE


Tiotropium Bromide/Olodaterol (Stiolto Respimat Inh Ossian (60 Puff)(Nf))  2 

puff INH DAILY [facility]


   Last Admin: 11/07/18 09:58 Dose:  Not Given











Physical Exam:


General: awake, alert, no distress, no diaphoresis


Head: normocephalic, atraumatic


HEENT: no pallor, no icterus, moist mucous membranes


Neck: soft, supple, no jvd, no stridor


CVS: normal rate, regular, no murmur


Resp: bilateral air entry, no rhales, no wheeze, no rhonchi, no acc muscle use


Abdomen: soft, nontender, nondistended, bowel sounds present


Ext: pulses+, warm, no edema


Skin: intact


Neuro: awake, alert, orientedx3, Right/Left arm 5/5, Left arm/leg 5/5, no 

dysarthria, CN intact








Labs: 


 Laboratory Results - last 24 hr











  11/06/18 11/06/18 11/06/18





  09:52 09:52 11:58


 


Sodium  140  


 


Potassium  4.2  


 


Chloride  103  


 


Carbon Dioxide  31  


 


Anion Gap  6  


 


BUN  18  


 


Creatinine  0.99  


 


Est GFR ( Amer)  87.4  


 


Est GFR (Non-Af Amer)  72.2  


 


BUN/Creatinine Ratio  18.2  


 


Glucose  105 H  


 


Hemoglobin A1c   4.8 


 


Calcium  9.6  


 


Total Bilirubin  0.50  


 


AST  19  


 


ALT  19  


 


Alkaline Phosphatase  81  


 


Troponin I  0.00  


 


Total Protein  6.9  


 


Albumin  4.2  


 


Globulin  2.7  


 


Albumin/Globulin Ratio  1.6  


 


Triglycerides  188  


 


Cholesterol  142  


 


LDL Cholesterol  55  


 


HDL Cholesterol  49.2  


 


TSH  1.83  


 


Urine Color    Straw


 


Urine Appearance    Clear


 


Urine pH    6.0


 


Ur Specific Gravity    1.024


 


Urine Protein    Negative


 


Urine Ketones    Negative


 


Urine Blood    Negative


 


Urine Nitrate    Negative


 


Urine Bilirubin    Negative


 


Urine Urobilinogen    Negative


 


Ur Leukocyte Esterase    Negative


 


Urine Glucose    Negative


 


Urine Ascorbic Acid    * A

















Imaging: 


CT brain 11/6 - no acute process; small arease of calcium noted (report reviewed

)








Assessment: 83y M w/pmhx of HTN, asthma/COPD; who comes to ER for sudden 

weakness in legs this morning at ~745am, some more right leg weakness noted. no 

falls/syncope. no slurred speech as per him. no confusion. Also noted right arm 

weakness. no cp/sob/n/v/abd pain. no blurred vision. comes to ER at ~938, seen 

by ER, Stroke code called, stat CT brain done demonstrating no acute hemorrhage

, likely areas of calcium only. BP 200s initially, then repeats to 160-180s, 

sinus rhythm. NIHSS 3 on arrival with right leg weakness, some dysarthria, some 

fingerpointing abn. Determined to be a candidate for tpa, started infusion at 

958am. Currently in bed, awake/alert, feels better, moving all ext. Last vitals 

160s systolic, HR 80s, NSR, on RA, no distress. No prior stroke history. 

MEdical history as above. Last NIHSS 0





CVA; s/p tpa 11/6


Hypertension


COPD








Plan:


Neuro- s/p tpa 11/6; No focal deficit on exam noted. NIH 0. ambulating in room 

lightly himself. BP stable overnight. MRI brain ordered for this morning. has 

been >24 hours, will start ASA after MRI. advance diet. CTA head 11/6 without 

obstruction. No arrhythmias on tele noted. TTE wtih PFO on bubble study, 

discussed with patient, possibility of this being the source and if recurrent 

episodes may have to look closer at this PFO. Statin PO, ASA. oob to chair, pt/

ot. neurology consult.





CVS- maintain BP <180; s/p tpa. statin/aspirin





Resp- RA, no distress. COPD, bronchodilators PRN.





ID- afebrile. wbc normal. monitor off abx.


GI- advance to cardiac diet


Renal- CR normal. no limon. monitor urine output


Heme- hg stable. on asa at home, restart if MRI okay. plt count okay. s/p tPA 

for CVA, monitor for bleeding.


Endo- hba1c 4.8, tsh 1.8


Musculsk- pressure ulcer prophylaxis. oob, pt/ot


Wounds- none


Nutrition- cardiac diet





DVT prophylaxis: SCDs


GI prophylaxis: -


Central Line:- 


Arterial Line: -


Limon Cathetor: -





Disposition: if MRI okay, may be able to transfer to tele today with continued 

neuro workup





Code Status: full code








Oscar Calvillo MD


Intensivist


(Electronically Signed)

## 2018-11-08 LAB
HCT VFR BLD AUTO: 43 % (ref 42–52)
HGB BLD-MCNC: 14.5 G/DL (ref 14–18)
MCH RBC QN AUTO: 32 PG (ref 27–31)
MCHC RBC AUTO-ENTMCNC: 34 G/DL (ref 31–36)
MCV RBC AUTO: 95 FL (ref 80–94)
PLATELET # BLD AUTO: 180 10^3/UL (ref 150–450)
RBC # BLD AUTO: 4.5 10^6/UL (ref 4–5.4)
WBC # BLD AUTO: 6.7 10^3/UL (ref 3.5–10.8)

## 2018-11-08 RX ADMIN — ATORVASTATIN CALCIUM SCH MG: 40 TABLET, FILM COATED ORAL at 16:57

## 2018-11-08 RX ADMIN — TIOTROPIUM BROMIDE SCH CAP: 18 CAPSULE ORAL; RESPIRATORY (INHALATION) at 07:24

## 2018-11-08 RX ADMIN — FORMOTEROL FUMARATE DIHYDRATE SCH MCG: 20 SOLUTION RESPIRATORY (INHALATION) at 07:24

## 2018-11-08 RX ADMIN — ASPIRIN SCH MG: 81 TABLET, COATED ORAL at 08:25

## 2018-11-08 RX ADMIN — FORMOTEROL FUMARATE DIHYDRATE SCH MCG: 20 SOLUTION RESPIRATORY (INHALATION) at 20:08

## 2018-11-08 RX ADMIN — CLOPIDOGREL SCH MG: 75 TABLET, FILM COATED ORAL at 08:25

## 2018-11-08 RX ADMIN — LISINOPRIL SCH MG: 10 TABLET ORAL at 08:25

## 2018-11-08 NOTE — CONS
CC:  Dr. Jasiel Granda *

 

CONSULTATION NOTE:

 

DATE OF CONSULT:  11/08/18

 

REFERRING PHYSICIANS:  Dr. Julio Vergara and Dr. Oscar Calvillo.

 

REASON FOR CARDIOLOGY CONSULTATION:  PFO assessment in patient with stroke.

 

HISTORY OF PRESENT ILLNESS:  Mr. Feliz is a pleasant 83-year-old gentleman who 
had an episode of double vision early last month felt due to temporal 
arteritis.  Two days ago, he developed left leg followed by left arm weakness 
and paresthesias and has been diagnosed after evaluation by the neurologist 
with small subacute nonhemorrhagic stroke in his left basal ganglia and 
anterior parietal lobe.  The patient had an echocardiogram completed 11/06/18 
with agitated saline infusion, of which I reviewed the images myself.  This 
shows really no more than a small-to-moderate size patent foramen ovale with 
normal left ventricular ejection fraction, normal cardiac chamber sizes and no 
evidence of right heart failure (please see also that report for further details
).  Patient himself denies chest pain or recent fainting (he last fainted when 
he was in the  standing at attention many years ago).  He does note 
that he has been short of breath for 40 years.  He denies palpitation.

 

PAST MEDICAL HISTORY:  Includes hypertension, asthma, double vision in early 
October 2018 due to TA..  BPH and hyperlipidemia.

 

MEDICATIONS:  On admission here include:

1.  Vitamin C.

2.  Calcium.

3.  Spiriva 10 mg once a day.

4.  Lipitor 10 mg once a day.

5.  Ventolin.

6.  Dutasteride 0.5 mg once a day.

7.  Levocetirizine 5 mg once a day.

8.  Aspirin 81 mg once a day.

9.  Lisinopril hydrochlorothiazide 20/25 mg once a day.

10.  Nasonex.

 

ALLERGIES TO MEDICATIONS:  AMPICILLIN, which causes him anaphylactic shock and 
PENICILLIN, which causes him ooziness.

 

FAMILY HISTORY:  Positive for cancer in his mother, stroke in his half sister.  
No family history of cardiac disease or diabetes.

 

SOCIAL HISTORY:  He does not smoke cigarettes, abuse alcohol, nor use illicit 
drugs.  He is  for 61 years.  He spent 18 years working as an , 
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  
He is a high school graduate.  He does not do regular aerobic exercise.

 

REVIEW OF SYSTEMS:  The patient denies cancer, vomiting, coughing up blood, 
bright red blood per rectum, bleeding stomach ulcers, renal calculi, 
cholelithiasis.  He has asthma.  He denies emphysema.  He has had pneumonia, 
not requiring hospitalization.  He denies tuberculosis, sleep apnea, home 
oxygen use, diabetes, hypertension.  He states he has been diagnosed with 
diastolic congestive heart failure.  He denies prior MI, cardiac surgery, 
cardiac murmurs, palpitations.  Of note, his transthoracic echocardiogram 
completed 11/06/18 did reveal evidence of diastolic dysfunction and the patient 
is maintained chronically on diuretics as noted above with hydrochlorothiazide.
  Denied psychiatric illnesses.  He denies lupus, psoriasis, seizures, Parkinson
's disease, myasthenia gravis, thyroid disorders, liver disorders, kidney 
disorders.  He does have pain in the back of his legs when he walks.  He has 
occasional peripheral edema.  He has GERD if he "over eats."  All other review 
of systems are negative x14 except as per this medical record.

 

PHYSICAL EXAM:  Height 5 feet 10 inches, weight 185 pounds.  Temperature 97.7 
degrees Fahrenheit, pulse 65, respiratory rate 18, blood pressure 138/51, O2 
saturation 95%.  On general exam, he is a pleasant anxious gentleman, in no 
acute distress.  HEENT shows cranium is normocephalic and atraumatic.  There 
are moist mucosal membranes.  Neck veins are not distended.  There are no 
carotid bruits visible.  Skin:  Warm and perfused.  Affect is appropriate.  He 
appears oriented. No significant kyphoscoliosis on back exam.  Lungs are clear 
to auscultation.  No wheezes.  No rales.  Cardiac Exam:  S1, S2.  Regular rate.
  No significant murmurs, rubs, or gallops.  PMI is nondisplaced.  Abdomen:  
Soft, nondistended, appears benign.  Extremities:  Without significant edema.  
Pulses appear grossly intact including normal 3+ bilateral dorsalis pedis and 
posterior tibial pulses in his bilateral lower extremities.

 

DIAGNOSTIC STUDIES/LAB DATA:  The patient completed a 12-lead EKG on 11/06/18, 
which showed sinus rhythm at 85 beats per minute felt to be within normal 
limits. Last transthoracic echocardiogram as described above.

 

White blood cell count 6.7, hematocrit 43, platelet count 180.  INR 0.91.  
Sodium 141, potassium 4.3, chloride 105, bicarbonate 29, BUN 20, creatinine 
0.93.  TSH 1.83, troponin 0 on admission.  ALT 19.

 

IMPRESSION:  Mr. Feliz is an 83-year-old gentleman with history of hypertension
, hyperlipidemia, admitted with nonhemorrhagic stroke while only on aspirin.  
He has subsequently been started on aspirin/Plavix combination for some time to 
be followed by Plavix life long per the neurologist.  He has been incidentally 
discovered to have no more than small-to-moderate sized patent foramen ovale by 
agitated saline infusion on his recent echocardiogram.  I reviewed this in 
detail with the patient including that patent foramen ovale are present in 15% 
of the general population and closure of such (if even feasible which may not 
be in this patient's case given that his is not very sizable) has not been 
definitively shown to reduce risk of recurrent stroke.

 

RECOMMENDATIONS:

1.  The patient will continue aspirin/Plavix antiplatelet regimen as per 
Neurology and will be transitioned to Plavix only lifelong.  Again his patent 
foramen ovale is not very sizable in his echocardiogram and no evidence of 
right heart dysfunction, so I think for now it is reasonable to hold on further 
evaluation of PFO closure unless he would have recurrent CNS symptoms despite 
antiplatelet therapy with current aspirin/Plavix combination followed by Plavix 
life long.  I do recommend titration of statin therapy to keep LDL less than 70 
mg/dL and continued hypertension management.

2.  The patient will follow up with myself in 1 to 2 months post discharge and 
at that time, I can further evaluate his concern for shortness of breath 
including with potential ischemic evaluation.  If we did want to move forward 
with PFO closure in the future, I would next proceed with transesophageal 
echocardiogram to better delineate that but again that does not appear required 
at this time.

 

The above has been discussed in detail with the patient with all questions 
answered and he is in agreement with these recommendations, including cardiac 
follow up with myself. 



Dear Dr. Calvillo and Dr. Vergara, many thanks for asking me to participate in the 
cardiovascular consultation care of Mr. Feliz.  Please do not hesitate to 
contact me if you have any questions or concerns regarding the patient's 
cardiovascular consultative care.

 

 [id number]/[facility] #: [id number]

MTDD

## 2018-11-08 NOTE — PN
Subjective


Date of Service: 11/08/18


Interval History: 





Pt seen and examined.  Meds and labs reviewed.  





CC: N/A





ROS:  Denied HA/dizziness, F/C, N/V, CP, SOB, increased cough, sputum production

, abd pain, diarrhea, constipation, dysuria, myalgias, arthralgias, throat pain

, and new skin lesions.  The rest of the 14 point ROS are unremarkable.





PHYSICAL EXAM:


GEN APPEARANCE: Awake, not in acute distress


HEENT: NC/AT, PERRLA, moist oral mucosa, (-) throat erythema


NECK: Soft, supple, (-) cervical LAD, (-)JVD


HEART: S1S2 WNL, RRR, No MRG


CHEST: CTA, BL, GAE, No W/R/R


ABD: Soft, ND/NT, NABS 4x Q


EXT: No C/C/E


SKIN: Warm to touch


PSYCH: No active psychosis, hallucinations, depression, SI/HI





Objective


Active Medications: 








Albuterol (Ventolin Hfa Inhaler*)  1 puff INH Q4H PRN


   PRN Reason: SOB/WHEEZING


Aspirin (Aspirin Ec Tab*)  81 mg PO DAILY [facility]


   Last Admin: 11/08/18 08:25 Dose:  81 mg


Atorvastatin Calcium (Lipitor*)  40 mg PO 1700 [facility]


   Last Admin: 11/07/18 17:34 Dose:  40 mg


Clopidogrel Bisulfate (Plavix Tab*)  75 mg PO DAILY [facility]


   Last Admin: 11/08/18 08:25 Dose:  75 mg


Formoterol Fumarate (Perforomist Neb.Soln*(Nf))  20 mcg INH BID [facility]


   Last Admin: 11/08/18 07:24 Dose:  20 mcg


Lisinopril (Prinivil Tab*)  20 mg PO DAILY [facility]


   Last Admin: 11/08/18 08:25 Dose:  20 mg


Tiotropium Bromide (Spiriva Cap.Inh*)  1 cap INH DAILY [facility]


   Last Admin: 11/08/18 07:24 Dose:  1 cap








 Vital Signs - 8 hr











  11/08/18 11/08/18 11/08/18





  08:57 11:46 15:27


 


Temperature  98.4 F 98.0 F


 


Pulse Rate  64 62


 


Respiratory 18 18 16





Rate   


 


Blood Pressure  114/44 120/51





(mmHg)   


 


O2 Sat by Pulse  96 98





Oximetry   











Oxygen Devices in Use Now: None


Result Diagrams: 


 11/08/18 06:49





 11/08/18 06:49


Microbiology and Other Data: 


 Microbiology











 11/06/18 13:00 Nasal Screen MRSA (PCR) - Final





 Nasal    Mrsa Not Detected














Assess/Plan/Problems-Billing


Assessment: 











- Patient Problems


(1) CVA (cerebral vascular accident)


Current Visit: Yes   Status: Acute   Code(s): I63.9 - CEREBRAL INFARCTION, 

UNSPECIFIED   SNOMED Code(s): 612617929


   Comment: 


-S/P TPA on 11/06


-MRI: Punctate foci w/left basal ganglia and anterior parietal lobe consistent w

/subacute non-hemorrhagic infarct; chronic appearing lacunar infarct of basal 

ganglia BL and left mid brain.


-TTE: EF 55-60%, diastolic dysfunction; (+)PFO


-Issue of possible temporal arteritiswill await F/U by Dr. Vergara


-Will check ESR and CRP


-Appreciate Dr. Hanks input: PF considered to be small w/no evidence of right 

heart dysfunction; hold further eval unless with new neurological S/S while on 

DAPT and statins; F/U in 1-2 mos post D/C with Cards   





(2) PFO (patent foramen ovale)


Current Visit: Yes   Status: Acute   Code(s): Q21.1 - ATRIAL SEPTAL DEFECT   

SNOMED Code(s): 515705795


   Comment: 


-As above   





(3) Hypertension


Current Visit: Yes   Status: Acute   Code(s): I10 - ESSENTIAL (PRIMARY) 

HYPERTENSION   SNOMED Code(s): 73553767


   Comment: 


-Continue Lisinopril    





(4) DVT prophylaxis


Current Visit: Yes   Status: Acute   Code(s): LLZ5101 -    SNOMED Code(s): 

612093716


   Comment: 


-Continue SCDs   


Status and Disposition: 





-For possible D/C in AM

## 2018-11-08 NOTE — PN
NEUROLOGICAL FOLLOWUP:

 

DATE OF SERVICE:  11/07/18 - ROOM #449

 

HISTORY:  He has no complaints.  He is walking fine.  No weakness.  No 
numbness. No speech problems.  No visual symptoms and he feels completely back 
to normal following his small stroke and TPA yesterday.  He tells me that in 
September or October, he had some double vision.  There was a biopsy taken.  
They called Dr. Roy, who is trying to track down his medical records, and will 
be calling me back about this.  He had been taking aspirin prior to this event 
and was restarted on aspirin.  His Lipitor was increased to 80.  He is on his 
inhaler, his Spiriva and Ventolin puff.

 

REVIEW OF SYSTEMS:  Negative.

 

PHYSICAL EXAMINATION:  Vital Signs:  Blood pressure 152/55, pulse 60, 
respirations 20, temperature 98.  He is alert and oriented with normal speech 
and comprehension. Cranial nerves II through XII are intact.  Fundi were 
benign.  Motor exam revealed normal tone and strength and coordination.  
Sensation intact to light touch.  Reflexes 1 and equal, downgoing toes.  Chest:
  Clear.  Cardiovascular:  Regular rate and rhythm.  Abdomen:  Soft, positive 
bowel sounds.

 

DIAGNOSTIC AND LABORATORY DATA:  His MRI showed a small subacute nonhemorrhagic 
stroke in his left basal ganglia and anteroparietal lobe.

 

His echo transthoracic showed a PFO with right to left shunt.  No clot was 
visualized.

 

I discussed this with Mr. Feliz that he has had a stroke, that he has no 
residual clinical symptoms from the stroke.  His CTA head, some atherosclerotic 
disease and that is probably the mechanism of his stroke and I would have him 
on aspirin and Plavix for 3 months' time and then just Plavix alone.  I have 
discussed this with Dr. Calvillo.  His LDL yesterday was 55 and we can do a fasting 
lipid but if remains under 70, I would not change his dose of Lipitor.  I have 
a call in to Dr. Roy, as mentioned above, to see if there is something in his 
recent workup that would be significant for his current neurological disease; 
but it was a thought that, I am not sure, but Mr. Feliz's history raises a 
possibility of possible temple arteritis, just in terms of the testing that was 
done and Dr. Roy will be calling me back.  I think that the PFO is most likely 
an incidental finding, but it would be reasonable for him to even see his 
cardiologist or see if the cardiologist here thinks that further testing would 
be warranted.

 

Thank you for sharing his case.

 

 [id number]/[facility] #: [id number]

LENIN

## 2018-11-09 VITALS — SYSTOLIC BLOOD PRESSURE: 124 MMHG | DIASTOLIC BLOOD PRESSURE: 48 MMHG

## 2018-11-09 LAB
HCT VFR BLD AUTO: 39 % (ref 42–52)
HGB BLD-MCNC: 13.3 G/DL (ref 14–18)
MCH RBC QN AUTO: 32 PG (ref 27–31)
MCHC RBC AUTO-ENTMCNC: 34 G/DL (ref 31–36)
MCV RBC AUTO: 93 FL (ref 80–94)
PLATELET # BLD AUTO: 154 10^3/UL (ref 150–450)
RBC # BLD AUTO: 4.19 10^6/UL (ref 4–5.4)
WBC # BLD AUTO: 5.3 10^3/UL (ref 3.5–10.8)

## 2018-11-09 RX ADMIN — CLOPIDOGREL SCH MG: 75 TABLET, FILM COATED ORAL at 09:19

## 2018-11-09 RX ADMIN — LISINOPRIL SCH MG: 10 TABLET ORAL at 09:19

## 2018-11-09 RX ADMIN — TIOTROPIUM BROMIDE SCH CAP: 18 CAPSULE ORAL; RESPIRATORY (INHALATION) at 09:21

## 2018-11-09 RX ADMIN — FORMOTEROL FUMARATE DIHYDRATE SCH MCG: 20 SOLUTION RESPIRATORY (INHALATION) at 09:20

## 2018-11-09 RX ADMIN — ASPIRIN SCH MG: 81 TABLET, COATED ORAL at 09:20

## 2018-11-10 NOTE — DS
CC:  Dr. Oscar Calvillo; Dr. Archie Limon; Dr. Vergara; Dr. Mondragon; Dr. Jasiel Granda

 

DISCHARGE SUMMARY:

 

DATE OF ADMISSION:

 

DATE OF DISCHARGE:  11/09/18

 

DISCHARGE DIAGNOSES:  As follows:

 

1.  Cerebrovascular accident, status post tissue plasminogen activator treatment.

2.  Patent foramen ovale, small; no treatment at this time is necessary, continue observation and gayle
chful waiting.

 

DISCHARGE MEDICATIONS:  As follows:

 

1.  Albuterol 1 to 2 puff inhalation q.4 hours p.r.n.

2.  Atorvastatin 40 mg p.o. daily.

3.  Clopidogrel 75 mg p.o. daily.

4.  Lisinopril 20 mg p.o. daily.

5.  Aspirin 81 mg p.o. daily.

6.  Calcium carbonate/vitamin D3 1 tablet p.o. b.i.d.

7.  Dutasteride 0.5 mg p.o. daily.

8.  Mometasone nasal spray 1 spray p.r.n. b.i.d.

9.  Montelukast 10 mg p.o. daily.

10.  Multivitamins 1 tab p.o. daily.

11.  Olopatadine 0.1% ophthalmic solution 1 to 2 drops to both eyes b.i.d.

12.  Tiotropium bromide/olodaterol 2 puff inhalation daily.

 

HISTORY OF PRESENT ILLNESS/HOSPITAL COURSE:  The patient is an 83-year-old  gentleman with h
istory of hypertension, asthma, and COPD, who presented to the ER due to sudden weakness in his legs 
in the morning at around 7:45 a.m. with some more right leg weakness noted.  There were no falls or s
yncope noted. Stat brain CT was done that does not demonstrate any form of acute hemorrhage and he wa
s found to have an NIHSS of 3 on arrival with right leg weakness, some dysarthria, and some finger-po
inting abnormality.  He was determined to be a candidate for tPA and hence an infusion was started at
 9:58 a.m. after being seen in the ER initially at 9:38 a.m.  He has done well and stayed in the ICU 
for closer monitoring and was then subsequently transferred to the floor.  He had a brain MRI that wa
s done that showed a punctate foci of restricted diffusion within the left basal ganglia and anterior
 parietal lobe consistent with subacute nonhemorrhagic infarct.  This was done on 11/07/18.  He also 
was found to have some chronic- appearing lacunar infarct of the basal ganglia bilaterally and left m
id brain with chronic small-vessel ischemic changes noted.  He also had a head CTA, which revealed no
 internal carotid artery stenosis by NASCET criteria nor was there any aneurysm, nor vascular malform
ation, occlusion, or stenosis that has been visualized in his intracranial circulation, although he d
oes have evidence of atherosclerosis.  A 2-D echo was subsequently done on 11/06/18, which showed EF 
of 55% to 60% with left ventricular chamber size that was found to be normal with some evidence of di
astolic dysfunction and a patent foramen ovale, which was then evaluated by Dr. Goddard, who advised th
at the patient continue aspirin, Plavix, and statins and had advised watchful waiting at this time un
less he would have recurrent CNS symptoms despite antiplatelet therapy.  The patient has also been ad
vised to follow up with Dr. Goddard in 1 to 2 months post discharge.

 

The patient has done well, and although there was a question of a possible differential of temporal a
rteritis, I have spoken with Dr. Vergara prior to him being discharged and mentioned it was unclear w
hy this has been raised given that there is no evidence from his previous notes to suggest that this 
would be part of the differential, and I have also drawn some ESR labs, which were found to be normal
, although CRP was still pending at the time of this dictation.

 

He was advised to follow up and/or call his PCP within 3 days post discharge and to follow up with Dr Brian Vergara in 2 to 3 months and to call [phone] to make and/or confirm an appointment.  He is also ad
vised to follow up with Dr. Goddard in 1 to 2 months and to call [phone] to make/confirm appointment. 
 He was advised to eat more fruits and veggies and if he is to eat meat, to prefer fish and white dot
t. He was advised to try to exercise as tolerated and he was advised that if his symptoms resume or d
evelop new ones or feel unwell for any reason, to call his PCP first.  If his PCP cannot entertain hi
m due to scheduling issues alone, to call [facility] if the issue is considered nonemergent. 
 He was advised to go to the ER for any type of emergency, among which is loss of motor and/or sensor
y functions, inability to speak given his recent CVA.  He was advised to call my office regarding any
 questions, concerns, or further clarifications regarding his discharge plans and/or prescriptions an
d to take his medications as prescribed.

 

REVIEW OF SYSTEMS:  The patient denied any current headaches, dizziness, fevers, chills, nausea, vomi
ting, chest pain, shortness of breath, increased cough.  No sputum production, abdominal pain, diarrh
ea, constipation, pain, and/or increased frequency on urination, myalgias, arthralgias, throat pain, 
or new skin lesions. The rest of the 14-point review of systems is otherwise unremarkable.

 

PHYSICAL EXAMINATION:  Reveals the most recent vital signs of record with blood pressure of 124/48, 6
9 beats per minute heart rate, 18 per minute respiratory rate, saturating at 99% room air.  General A
ppearance:  The patient is awake, alert, and oriented x3, not in acute distress.  HEENT:  Normocephal
ic, atraumatic.  PERRLA. Extraocular muscles intact.  Negative for icterus.  Moist oral mucosa.  Nega
tive throat erythema.  Neck:  Soft, supple with no cervical lymphadenopathy.  No JVD. Heart:  S1, S2 
within normal limits.  Regular rate and rhythm.  No murmurs, rubs, or gallops.  Chest:  Clear to ausc
ultation bilaterally.  Good air entry.  No wheezes, rales, or rhonchi.  Abdomen:  Soft, nondistended,
 nontender.  Normoactive bowel sounds x4 quadrants.  Extremities:  No cyanosis, clubbing, or edema. P
sychiatric:  No active psychosis, depression, suicidal or homicidal ideations. Skin:  Warm to touch.

 

TIME SPENT:  The total time spent evaluating the patient, reviewing pertinent data, and appropriate d
ocumentation is 50 minutes.

 

 [id number]/[id number] #: [id number]